# Patient Record
Sex: FEMALE | Race: WHITE | NOT HISPANIC OR LATINO | Employment: UNEMPLOYED | URBAN - METROPOLITAN AREA
[De-identification: names, ages, dates, MRNs, and addresses within clinical notes are randomized per-mention and may not be internally consistent; named-entity substitution may affect disease eponyms.]

---

## 2019-06-17 ENCOUNTER — OFFICE VISIT (OUTPATIENT)
Dept: OTOLARYNGOLOGY | Facility: CLINIC | Age: 54
End: 2019-06-17
Payer: COMMERCIAL

## 2019-06-17 VITALS
HEART RATE: 107 BPM | BODY MASS INDEX: 24.72 KG/M2 | SYSTOLIC BLOOD PRESSURE: 106 MMHG | DIASTOLIC BLOOD PRESSURE: 61 MMHG | WEIGHT: 135.13 LBS | TEMPERATURE: 98 F

## 2019-06-17 DIAGNOSIS — K11.8 SUBMANDIBULAR GLAND MASS: Primary | ICD-10-CM

## 2019-06-17 PROCEDURE — 88173 CYTOPATH EVAL FNA REPORT: CPT | Mod: 26,,, | Performed by: PATHOLOGY

## 2019-06-17 PROCEDURE — 88177 CYTP FNA EVAL EA ADDL: CPT | Mod: 26,,, | Performed by: PATHOLOGY

## 2019-06-17 PROCEDURE — 99204 PR OFFICE/OUTPT VISIT, NEW, LEVL IV, 45-59 MIN: ICD-10-PCS | Mod: 25,S$GLB,, | Performed by: OTOLARYNGOLOGY

## 2019-06-17 PROCEDURE — 3008F BODY MASS INDEX DOCD: CPT | Mod: CPTII,S$GLB,, | Performed by: OTOLARYNGOLOGY

## 2019-06-17 PROCEDURE — 88177 CYTOLOGY SPECIMEN-FNA NON-RADIOLOGY CLINICIAN PERFORMED W/O ON SITE: ICD-10-PCS | Mod: 26,,, | Performed by: PATHOLOGY

## 2019-06-17 PROCEDURE — 10005 FNA BX W/US GDN 1ST LES: CPT | Mod: S$GLB,,, | Performed by: OTOLARYNGOLOGY

## 2019-06-17 PROCEDURE — 99999 PR PBB SHADOW E&M-NEW PATIENT-LVL III: CPT | Mod: PBBFAC,,, | Performed by: OTOLARYNGOLOGY

## 2019-06-17 PROCEDURE — 99204 OFFICE O/P NEW MOD 45 MIN: CPT | Mod: 25,S$GLB,, | Performed by: OTOLARYNGOLOGY

## 2019-06-17 PROCEDURE — 88177 CYTP FNA EVAL EA ADDL: CPT | Performed by: PATHOLOGY

## 2019-06-17 PROCEDURE — 88172 CYTP DX EVAL FNA 1ST EA SITE: CPT | Mod: 26,,, | Performed by: PATHOLOGY

## 2019-06-17 PROCEDURE — 99999 PR PBB SHADOW E&M-NEW PATIENT-LVL III: ICD-10-PCS | Mod: PBBFAC,,, | Performed by: OTOLARYNGOLOGY

## 2019-06-17 PROCEDURE — 10005 PR FINE NEEDLE ASP BIOPSY, W/US GUIDANCE, 1ST LESION: ICD-10-PCS | Mod: S$GLB,,, | Performed by: OTOLARYNGOLOGY

## 2019-06-17 PROCEDURE — 88172 CYTOLOGY SPECIMEN-FNA NON-RADIOLOGY CLINICIAN PERFORMED W/O ON SITE: ICD-10-PCS | Mod: 26,,, | Performed by: PATHOLOGY

## 2019-06-17 PROCEDURE — 88173 CYTOLOGY SPECIMEN-FNA NON-RADIOLOGY CLINICIAN PERFORMED W/O ON SITE: ICD-10-PCS | Mod: 26,,, | Performed by: PATHOLOGY

## 2019-06-17 PROCEDURE — 3008F PR BODY MASS INDEX (BMI) DOCUMENTED: ICD-10-PCS | Mod: CPTII,S$GLB,, | Performed by: OTOLARYNGOLOGY

## 2019-06-17 RX ORDER — LIDOCAINE HYDROCHLORIDE 10 MG/ML
1 INJECTION, SOLUTION EPIDURAL; INFILTRATION; INTRACAUDAL; PERINEURAL ONCE
Status: CANCELLED | OUTPATIENT
Start: 2019-06-17 | End: 2019-06-17

## 2019-06-17 RX ORDER — ATORVASTATIN CALCIUM 10 MG/1
10 TABLET, FILM COATED ORAL NIGHTLY
COMMUNITY

## 2019-06-17 RX ORDER — SODIUM CHLORIDE 0.9 % (FLUSH) 0.9 %
10 SYRINGE (ML) INJECTION
Status: CANCELLED | OUTPATIENT
Start: 2019-06-17

## 2019-06-17 RX ORDER — METFORMIN HYDROCHLORIDE 1000 MG/1
1000 TABLET, FILM COATED, EXTENDED RELEASE ORAL 2 TIMES DAILY
COMMUNITY

## 2019-06-17 RX ORDER — DAPAGLIFLOZIN 10 MG/1
10 TABLET, FILM COATED ORAL DAILY
COMMUNITY

## 2019-06-17 NOTE — LETTER
June 18, 2019      Immanuel Proctor MD  [address] [facility]  [place] [address]           [facility]  [address]  [facility] [address]  Phone: [phone]  Fax: [phone]          Patient: Marylu Augustin   MR Number: [id number]   YOB: 1965   Date of Visit: 6/17/2019       Dear Dr. Immanuel Proctor:    Thank you for referring Marylu Augustin to me for evaluation. Attached you will find relevant portions of my assessment and plan of care.    If you have questions, please do not hesitate to call me. I look forward to following Marylu Augustin along with you.    Sincerely,    Michael Cole MD    Enclosure  CC:  No Recipients    If you would like to receive this communication electronically, please contact [email or web address] or [phone] to request more information on [employer] access.    For providers and/or their staff who would like to refer a patient to [facility], please contact us through our one-stop-shop provider referral line, [facility], at [phone].    If you feel you have received this communication in error or would no longer like to receive these types of communications, please e-mail [email or web address]

## 2019-06-17 NOTE — PRE ADMISSION SCREENING
Anesthesia Assessment: Preoperative EQUATION    Planned Procedure: Procedure(s) (LRB):  EXCISION, SUBMANDIBULAR GLAND (Right)  Requested Anesthesia Type:General  Surgeon: Michael Cole MD  Service: ENT  Known or anticipated Date of Surgery:7/5/2019    Surgeon notes: reviewed    Electronic QUestionnaire Assessment completed via nurse interview with patient.        NO AQ        Triage considerations:     The patient has no apparent active cardiac condition (No unstable coronary Syndrome such as severe unstable angina or recent [<1 month] myocardial infarction, decompensated CHF, severe valvular   disease or significant arrhythmia)    Previous anesthesia records:Not available    Last PCP note: within 1 month , outside [facility]   Subspecialty notes: ENT, Pain Management    Other important co-morbidities: DM, HTN, OBESITY     Tests already available:  No recent tests.            Instructions given. (See in Nurse's note)    Optimization:  Anesthesia Preop Clinic Assessment  Indicated    Medical Opinion Indicated-Not indicated for this procedure          Plan:    Testing:  Hematology Profile, CMP and EKG-Will retrieve from [facility] PCP if avail, if not will order     Patient  has previously scheduled Medical Appointment:NOT AT THIS TIME    Navigation: Tests Scheduled.             Results will be tracked by Preop Clinic.

## 2019-06-18 ENCOUNTER — ANESTHESIA EVENT (OUTPATIENT)
Dept: SURGERY | Facility: HOSPITAL | Age: 54
End: 2019-06-18
Payer: COMMERCIAL

## 2019-06-18 DIAGNOSIS — Z01.818 PREOPERATIVE TESTING: Primary | ICD-10-CM

## 2019-06-18 PROBLEM — K11.8 SUBMANDIBULAR GLAND MASS: Status: ACTIVE | Noted: 2019-06-18

## 2019-06-18 RX ORDER — LOSARTAN POTASSIUM 50 MG/1
50 TABLET ORAL DAILY
COMMUNITY

## 2019-06-18 RX ORDER — TRAZODONE HYDROCHLORIDE 50 MG/1
50 TABLET ORAL NIGHTLY
COMMUNITY
End: 2022-04-22 | Stop reason: CLARIF

## 2019-06-18 RX ORDER — DULOXETIN HYDROCHLORIDE 60 MG/1
90 CAPSULE, DELAYED RELEASE ORAL DAILY
COMMUNITY
End: 2022-04-22 | Stop reason: CLARIF

## 2019-06-18 NOTE — ASSESSMENT & PLAN NOTE
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  Both malignant and benign etiologies are considered.  FNA obtained today.    I recommended that we proceed to the operating room for right submandibular gland resection.  Will send the specimen for frozen section and, should be indicated, proceed with neck dissection concomitantly.  She is amenable to this plan.    We discussed the risks, benefits, and indications to right submandibular gland resection. The risks were noted to include, but not be limited to, infection, bleeding, scarring, collection of blood or tissue fluid requiring drainage, weakness of the lip which may be temporary or permanent, weakness/numbness of the tongue which could be temporary or permanent and cause speech and/or swallowing difficulty, wand the need for additional procedures. Time was allowed for questions, and all questions were answered to the patient's apparent satisfaction.     Surgery is scheduled on July 5, 2019.

## 2019-06-18 NOTE — PROGRESS NOTES
Chief Complaint   Patient presents with    consult/ mass in jaw       HPI   54 y.o. female presents for evaluation of a right-sided submandibular gland mass.  She reports that this mass has been present for at least several weeks.  She noted pain in the submandibular gland at that time. She was treated with course of antibiotics which improved her pain.  She does, however, report that she has recently lost roughly 35 lb intentionally.  She is uncertain if this weight loss has made this lesion more apparent and of the true time course of its presence.  She denies recent dental work, mouth pain, dysphagia or shortness of breath.  CT scan of the neck at an outside hospital revealed an approximately 1.1 cm maximum dimension lesion within the right submandibular gland.  She also underwent an ultrasound of the neck which confirmed this finding.    Review of Systems   Constitutional: Negative for fatigue and unexpected weight change.   HENT: Per HPI.  Eyes: Negative for visual disturbance.   Respiratory: Negative for shortness of breath, hemoptysis   Cardiovascular: Negative for chest pain and palpitations.   Musculoskeletal: Negative for decreased ROM, back pain.   Skin: Negative for rash, sunburn, itching.   Neurological: Negative for dizziness and seizures.   Hematological: Negative for adenopathy. Does not bruise/bleed easily.   Endocrine: Negative for rapid weight loss/weight gain, heat/cold intolerance.     Past Medical History   Patient Active Problem List   Diagnosis    Lumbar radiculopathy    Lumbosacral radiculopathy           Past Surgical History   Past Surgical History:   Procedure Laterality Date    ANTERIOR LUMBAR FUSION W/ FRA      L5 S1    BACK SURGERY  3/5/2014    Cleaned herniated disk L4 and L5    CHOLECYSTECTOMY      ENDOMETRIAL ABLATION      D&C - for heavy periods    INJECTION-STEROID-EPIDURAL-TRANSFORAMINAL (L5,S1) Right 7/10/2015    [facility]     INJECTION-STEROID-EPIDURAL-TRANSFORAMINAL (L5,S1) Right 6/26/2015    [facility] OR    KNEE ARTHROSCOPY Bilateral     Meniscus repair         Family History   Family History   Problem Relation Age of Onset    Hypertension Father     Diabetes Father     Hypertension Mother     Diabetes Mother     Cancer Mother 72        Breast           Social History   .  Social History     Socioeconomic History    Marital status:      Spouse name: Not on file    Number of children: Not on file    Years of education: Not on file    Highest education level: Not on file   Occupational History    Not on file   Social Needs    Financial resource strain: Not on file    Food insecurity:     Worry: Not on file     Inability: Not on file    Transportation needs:     Medical: Not on file     Non-medical: Not on file   Tobacco Use    Smoking status: Never Smoker    Smokeless tobacco: Never Used   Substance and Sexual Activity    Alcohol use: No    Drug use: No    Sexual activity: Yes     Partners: Male     Birth control/protection: Surgical     Comment:    Lifestyle    Physical activity:     Days per week: Not on file     Minutes per session: Not on file    Stress: Not on file   Relationships    Social connections:     Talks on phone: Not on file     Gets together: Not on file     Attends Confucianist service: Not on file     Active member of club or organization: Not on file     Attends meetings of clubs or organizations: Not on file     Relationship status: Not on file   Other Topics Concern    Not on file   Social History Narrative    Not on file         Allergies   Review of patient's allergies indicates:   Allergen Reactions    Sulfa (sulfonamide antibiotics) Hives and Itching           Physical Exam     Vitals:    06/17/19 1341   BP: 106/61   Pulse: 107   Temp: 97.8 °F (36.6 °C)         Body mass index is 24.72 kg/m².      General: AOx3, NAD   Respiratory:  Symmetric chest  rise, normal effort  Nose: No gross nasal septal deviation. Inferior Turbinates WNL bilaterally. No septal perforation. No masses/lesions.   Oral Cavity:  Oral Tongue mobile, no lesions noted. Hard Palate WNL. No buccal or FOM lesions.  Oropharynx:  No masses/lesions of the posterior pharyngeal wall. Tonsillar fossa without lesions. Soft palate without masses. Midline uvula.   Neck: No scars.  No cervical lymphadenopathy, thyromegaly or thyroid nodules.  Normal range of motion.  Approximately 1 cm firm lesion within the right submandibular gland.  Face: House Brackmann I bilaterally.     Outside CT, ultrasound reviewed.    Procedure:  Ultrasound-guided fine-needle aspiration of right submandibular gland mass.    Informed consent obtained.  The lesion in the right submandibular gland was localized.  A normal sized jugulodigastric node of normal morphology was also identified.  It was measured at roughly 1 x 1 cm.  It had a well marginated, hypoechoic appearance.  The overlying skin and subcutaneous tissue was marked and anesthetized with approximately 1 cc of 1% lidocaine with epinephrine.  Under ultrasound guidance, fine-needle aspiration was performed of this lesion utilizing a 22 and a 25 gauge needle.  On site side pathology analyzed specimen and reported only blood within the aspirate.  Additional specimen was sent for permanent analysis.  She tolerated the procedure well.    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  Both malignant and benign etiologies are considered.  FNA obtained today.    I recommended that we proceed to the operating room for right submandibular gland resection.  Will send the specimen for frozen section and, should be indicated, proceed with neck dissection concomitantly.  She is amenable to this plan.    We  discussed the risks, benefits, and indications to right submandibular gland resection. The risks were noted to include, but not be limited to, infection, bleeding, scarring, collection of blood or tissue fluid requiring drainage, weakness of the lip which may be temporary or permanent, weakness/numbness of the tongue which could be temporary or permanent and cause speech and/or swallowing difficulty, wand the need for additional procedures. Time was allowed for questions, and all questions were answered to the patient's apparent satisfaction.     Surgery is scheduled on July 5, 2019.         Relevant Orders    Cytology Specimen-FNA NON-Radiology Clinician Performed Without On Site Pathologist Adequacy    Case Request Operating Room: EXCISION, SUBMANDIBULAR GLAND (Completed)

## 2019-06-18 NOTE — ANESTHESIA PREPROCEDURE EVALUATION
[facility]  Anesthesia Pre-Operative Evaluation         Patient Name: Marylu Augustin  YOB: 1965  MRN: [id number]    SUBJECTIVE:     Pre-operative evaluation for Procedure(s) (LRB):  EXCISION, SUBMANDIBULAR GLAND (Right)     07/03/2019    Marylu Augustin is a 54 y.o. female w/ a significant PMHx of chronic pain syndrome, lumbar radiculopathy, and submandibular mass. The mass is painful and she denies recent dental work, mouth pain, or dysphagia. Imaging confirmed 1.1cm lesion.    Patient now presents for the above procedure(s).    LDA: None documented.    Prev airway: None documented.    Drips: None documented.    Patient Active Problem List   Diagnosis    Lumbar radiculopathy    Lumbosacral radiculopathy    Submandibular gland mass       Review of patient's allergies indicates:   Allergen Reactions    Sulfa (sulfonamide antibiotics) Hives and Itching       Current Outpatient Medications:  No current facility-administered medications for this encounter.     Current Outpatient Medications:     atorvastatin (LIPITOR) 10 MG tablet, Take 10 mg by mouth every evening., Disp: , Rfl:     cyclobenzaprine (FLEXERIL) 10 MG tablet, Take 10 mg by mouth 2 (two) times daily as needed. , Disp: , Rfl: 0    dapagliflozin (FARXIGA) 10 mg Tab, Take 10 mg by mouth once daily., Disp: , Rfl:     dulaglutide (TRULICITY) 0.75 mg/0.5 mL PnIj, Inject 0.75 mg into the skin every 7 days., Disp: , Rfl:     DULoxetine (CYMBALTA) 60 MG capsule, Take 90 mg by mouth once daily., Disp: , Rfl:     gabapentin (NEURONTIN) 600 MG tablet, Take 1 tablet by mouth 3 (three) times daily., Disp: , Rfl:     hydrocodone-acetaminophen 7.5-325mg (NORCO) 7.5-325 mg per tablet, Take 1 tablet by mouth 3 (three) times daily as needed., Disp: , Rfl:     losartan (COZAAR) 50 MG tablet, Take 50 mg by mouth once daily., Disp: , Rfl:     metFORMIN (GLUMETZA) 1000 MG (MOD) 24 hr tablet, Take 1,000 mg by mouth 2 (two)  times daily. , Disp: , Rfl:     traZODone (DESYREL) 50 MG tablet, Take 50 mg by mouth every evening., Disp: , Rfl:     Past Surgical History:   Procedure Laterality Date    ANTERIOR LUMBAR FUSION W/ FRA      L5 S1    BACK SURGERY  3/5/2014    Cleaned herniated disk L4 and L5    CHOLECYSTECTOMY      ENDOMETRIAL ABLATION      D&C - for heavy periods    INJECTION-STEROID-EPIDURAL-TRANSFORAMINAL (L5,S1) Right 7/10/2015    [facility]    INJECTION-STEROID-EPIDURAL-TRANSFORAMINAL (L5,S1) Right 6/26/2015    [facility] OR    KNEE ARTHROSCOPY Bilateral     Meniscus repair       Social History     Socioeconomic History    Marital status:      Spouse name: Not on file    Number of children: Not on file    Years of education: Not on file    Highest education level: Not on file   Occupational History    Not on file   Social Needs    Financial resource strain: Not on file    Food insecurity:     Worry: Not on file     Inability: Not on file    Transportation needs:     Medical: Not on file     Non-medical: Not on file   Tobacco Use    Smoking status: Never Smoker    Smokeless tobacco: Never Used   Substance and Sexual Activity    Alcohol use: No    Drug use: No    Sexual activity: Yes     Partners: Male     Birth control/protection: Surgical     Comment:    Lifestyle    Physical activity:     Days per week: Not on file     Minutes per session: Not on file    Stress: Not on file   Relationships    Social connections:     Talks on phone: Not on file     Gets together: Not on file     Attends Jain service: Not on file     Active member of club or organization: Not on file     Attends meetings of clubs or organizations: Not on file     Relationship status: Not on file   Other Topics Concern    Not on file   Social History Narrative    Not on file       OBJECTIVE:     Vital Signs Range (Last 24H):         Significant Labs:  Lab  Results   Component Value Date    WBC 7.93 06/24/2019    HGB 14.2 06/24/2019    HCT 43.8 06/24/2019     06/24/2019    ALT 55 (H) 06/24/2019    AST 36 06/24/2019     06/24/2019    K 4.3 06/24/2019     06/24/2019    CREATININE 0.8 06/24/2019    BUN 16 06/24/2019    CO2 32 (H) 06/24/2019       Diagnostic Studies: No relevant studies.    EKG:   Vent. Rate : 090 BPM     Atrial Rate : 090 BPM     P-R Int : 160 ms          QRS Dur : 078 ms      QT Int : 360 ms       P-R-T Axes : 044 018 020 degrees     QTc Int : 440 ms    Normal sinus rhythm  Within normal limits    No previous ECGs available  Confirmed by Ton STEWART, Rgahu ([id number]) on 6/24/2019 4:29:31 PM       2D ECHO:  No results found for this or any previous visit.      ASSESSMENT/PLAN:            Adali Interiano, RN   Registered Nurse      Pre Admission Screening   Signed                       []Hide copied text    []Hover for details  Anesthesia Assessment: Preoperative EQUATION     Planned Procedure: Procedure(s) (LRB):  EXCISION, SUBMANDIBULAR GLAND (Right)  Requested Anesthesia Type:General  Surgeon: Michael Cole MD  Service: ENT  Known or anticipated Date of Surgery:7/5/2019     Surgeon notes: reviewed     Electronic QUestionnaire Assessment completed via nurse interview with patient.         NO AQ           Triage considerations:      The patient has no apparent active cardiac condition (No unstable coronary Syndrome such as severe unstable angina or recent [<1 month] myocardial infarction, decompensated CHF, severe valvular   disease or significant arrhythmia)     Previous anesthesia records:Not available     [facility]   Subspecialty notes: ENT, Pain Management     Other important co-morbidities: DM, HTN, OBESITY     Tests already available:  No recent tests.                            Instructions given. (See in Nurse's note)     Optimization:  Anesthesia Preop Clinic Assessment  Indicated     Medical Opinion Indicated-Not indicated for this procedure                                          Plan:    Testing:  Hematology Profile, CMP and EKG-Will retrieve from [facility] PCP if avail, if not will order                           Patient  has previously scheduled Medical Appointment:NOT AT THIS TIME     Navigation: Tests Scheduled.                        Results will be tracked by Preop Clinic.                                                                                                                          06/18/2019  Marylu Augustin is a 54 y.o., female.    Anesthesia Evaluation    I have reviewed the Patient Summary Reports.    I have reviewed the Nursing Notes.      Review of Systems  Anesthesia Hx:  Hx of Anesthetic complications  History of prior surgery of interest to airway management or planning: Previous anesthesia: General COLONOSCOPY 2/2/19 with general anesthesia.  Denies Family Hx of Anesthesia complications.  Personal Hx of Anesthesia complications, Post-Operative Nausea/Vomiting   Social:  Social Alcohol Use, Non-Smoker    Hematology/Oncology:  Hematology Normal   Oncology Normal     EENT/Dental:   Jaw Problems: Submandibular gland mass  Cardiovascular:    Denies Angina.  Functional Capacity 4 METS  Hypertension    Pulmonary:  Pulmonary Normal  Denies Shortness of breath.  Denies Recent URI.    Renal/:  Renal/ Normal     Hepatic/GI:  Liver Disease, Fatty Liver    Musculoskeletal:  Musculoskeletal Normal    Neurological:   Lumbar radiculopathy   Endocrine:   Diabetes, well controlled  Diabetes , controlled by non-insulin injectables, diet, oral hypoglycemics.  Metabolic Disorders  Psych:  Depression and Major Depression, Treated.          Physical Exam  General:  Well nourished    Airway/Jaw/Neck:  Airway Findings: Mouth Opening: Small, but > 3cm Tongue: Normal  General Airway Assessment: Adult  Mallampati: II  TM Distance: Normal, at least 6 cm  Jaw/Neck Findings:  Neck ROM: Normal ROM       Dental:  Dental Findings: In tact   Chest/Lungs:  Chest/Lungs Findings: Clear to auscultation     Heart/Vascular:  Heart Findings: Rate: Normal  Rhythm: Regular Rhythm        Mental Status:  Mental Status Findings:  Cooperative, Alert and Oriented         Anesthesia Plan  Type of Anesthesia, risks & benefits discussed:  Anesthesia Type:  general  Patient's Preference:   Intra-op Monitoring Plan: standard ASA monitors  Intra-op Monitoring Plan Comments:   Post Op Pain Control Plan: per primary service following discharge from PACU, IV/PO Opioids PRN and multimodal analgesia  Post Op Pain Control Plan Comments:   Induction:   IV  Beta Blocker:  Patient is not currently on a Beta-Blocker (No further documentation required).       Informed Consent: Patient understands risks and agrees with Anesthesia plan.  Questions answered. Anesthesia consent signed with patient.  ASA Score: 2     Day of Surgery Review of History & Physical:    H&P update referred to the surgeon.         Ready For Surgery From Anesthesia Perspective.

## 2019-06-18 NOTE — H&P (VIEW-ONLY)
Chief Complaint   Patient presents with    consult/ mass in jaw       HPI   54 y.o. female presents for evaluation of a right-sided submandibular gland mass.  She reports that this mass has been present for at least several weeks.  She noted pain in the submandibular gland at that time. She was treated with course of antibiotics which improved her pain.  She does, however, report that she has recently lost roughly 35 lb intentionally.  She is uncertain if this weight loss has made this lesion more apparent and of the true time course of its presence.  She denies recent dental work, mouth pain, dysphagia or shortness of breath.  CT scan of the neck at an outside hospital revealed an approximately 1.1 cm maximum dimension lesion within the right submandibular gland.  She also underwent an ultrasound of the neck which confirmed this finding.    Review of Systems   Constitutional: Negative for fatigue and unexpected weight change.   HENT: Per HPI.  Eyes: Negative for visual disturbance.   Respiratory: Negative for shortness of breath, hemoptysis   Cardiovascular: Negative for chest pain and palpitations.   Musculoskeletal: Negative for decreased ROM, back pain.   Skin: Negative for rash, sunburn, itching.   Neurological: Negative for dizziness and seizures.   Hematological: Negative for adenopathy. Does not bruise/bleed easily.   Endocrine: Negative for rapid weight loss/weight gain, heat/cold intolerance.     Past Medical History   Patient Active Problem List   Diagnosis    Lumbar radiculopathy    Lumbosacral radiculopathy           Past Surgical History   Past Surgical History:   Procedure Laterality Date    ANTERIOR LUMBAR FUSION W/ FRA      L5 S1    BACK SURGERY  3/5/2014    Cleaned herniated disk L4 and L5    CHOLECYSTECTOMY      ENDOMETRIAL ABLATION      D&C - for heavy periods    INJECTION-STEROID-EPIDURAL-TRANSFORAMINAL (L5,S1) Right 7/10/2015    Performed by Tessy Buchanan [facility] at [facility]     INJECTION-STEROID-EPIDURAL-TRANSFORAMINAL (L5,S1) Right 6/26/2015    [facility]    KNEE ARTHROSCOPY Bilateral     Meniscus repair         Family History   Family History   Problem Relation Age of Onset    Hypertension Father     Diabetes Father     Hypertension Mother     Diabetes Mother     Cancer Mother 72        Breast           Social History   .  Social History     Socioeconomic History    Marital status:      Spouse name: Not on file    Number of children: Not on file    Years of education: Not on file    Highest education level: Not on file   Occupational History    Not on file   Social Needs    Financial resource strain: Not on file    Food insecurity:     Worry: Not on file     Inability: Not on file    Transportation needs:     Medical: Not on file     Non-medical: Not on file   Tobacco Use    Smoking status: Never Smoker    Smokeless tobacco: Never Used   Substance and Sexual Activity    Alcohol use: No    Drug use: No    Sexual activity: Yes     Partners: Male     Birth control/protection: Surgical     Comment:    Lifestyle    Physical activity:     Days per week: Not on file     Minutes per session: Not on file    Stress: Not on file   Relationships    Social connections:     Talks on phone: Not on file     Gets together: Not on file     Attends Bahai service: Not on file     Active member of club or organization: Not on file     Attends meetings of clubs or organizations: Not on file     Relationship status: Not on file   Other Topics Concern    Not on file   Social History Narrative    Not on file         Allergies   Review of patient's allergies indicates:   Allergen Reactions    Sulfa (sulfonamide antibiotics) Hives and Itching           Physical Exam     Vitals:    06/17/19 1341   BP: 106/61   Pulse: 107   Temp: 97.8 °F (36.6 °C)         Body mass index is 24.72 kg/m².      General: AOx3, NAD   Respiratory:  Symmetric chest  rise, normal effort  Nose: No gross nasal septal deviation. Inferior Turbinates WNL bilaterally. No septal perforation. No masses/lesions.   Oral Cavity:  Oral Tongue mobile, no lesions noted. Hard Palate WNL. No buccal or FOM lesions.  Oropharynx:  No masses/lesions of the posterior pharyngeal wall. Tonsillar fossa without lesions. Soft palate without masses. Midline uvula.   Neck: No scars.  No cervical lymphadenopathy, thyromegaly or thyroid nodules.  Normal range of motion.  Approximately 1 cm firm lesion within the right submandibular gland.  Face: House Brackmann I bilaterally.     Outside CT, ultrasound reviewed.    Procedure:  Ultrasound-guided fine-needle aspiration of right submandibular gland mass.    Informed consent obtained.  The lesion in the right submandibular gland was localized.  A normal sized jugulodigastric node of normal morphology was also identified.  It was measured at roughly 1 x 1 cm.  It had a well marginated, hypoechoic appearance.  The overlying skin and subcutaneous tissue was marked and anesthetized with approximately 1 cc of 1% lidocaine with epinephrine.  Under ultrasound guidance, fine-needle aspiration was performed of this lesion utilizing a 22 and a 25 gauge needle.  On site side pathology analyzed specimen and reported only blood within the aspirate.  Additional specimen was sent for permanent analysis.  She tolerated the procedure well.    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  Both malignant and benign etiologies are considered.  FNA obtained today.    I recommended that we proceed to the operating room for right submandibular gland resection.  Will send the specimen for frozen section and, should be indicated, proceed with neck dissection concomitantly.  She is amenable to this plan.    We  discussed the risks, benefits, and indications to right submandibular gland resection. The risks were noted to include, but not be limited to, infection, bleeding, scarring, collection of blood or tissue fluid requiring drainage, weakness of the lip which may be temporary or permanent, weakness/numbness of the tongue which could be temporary or permanent and cause speech and/or swallowing difficulty, wand the need for additional procedures. Time was allowed for questions, and all questions were answered to the patient's apparent satisfaction.     Surgery is scheduled on July 5, 2019.         Relevant Orders    Cytology Specimen-FNA NON-Radiology Clinician Performed Without On Site Pathologist Adequacy    Case Request Operating Room: EXCISION, SUBMANDIBULAR GLAND (Completed)

## 2019-06-19 ENCOUNTER — TELEPHONE (OUTPATIENT)
Dept: PREADMISSION TESTING | Facility: HOSPITAL | Age: 54
End: 2019-06-19

## 2019-06-19 NOTE — TELEPHONE ENCOUNTER
----- Message from Adali Interiano RN sent at 6/18/2019 12:15 PM CDT -----  PLEASE CALL PATIENT [phone] TO SCHEDULE LAB/EKG.                                                 THANKS!

## 2019-06-24 ENCOUNTER — HOSPITAL ENCOUNTER (OUTPATIENT)
Dept: CARDIOLOGY | Facility: CLINIC | Age: 54
Discharge: HOME | End: 2019-06-24
Payer: COMMERCIAL

## 2019-06-24 DIAGNOSIS — Z01.818 PREOPERATIVE TESTING: ICD-10-CM

## 2019-06-24 PROCEDURE — 93010 EKG 12-LEAD: ICD-10-PCS | Mod: S$GLB,,, | Performed by: INTERNAL MEDICINE

## 2019-06-24 PROCEDURE — 93005 EKG 12-LEAD: ICD-10-PCS | Mod: S$GLB,,, | Performed by: ANESTHESIOLOGY

## 2019-06-24 PROCEDURE — 93010 ELECTROCARDIOGRAM REPORT: CPT | Mod: S$GLB,,, | Performed by: INTERNAL MEDICINE

## 2019-06-24 PROCEDURE — 93005 ELECTROCARDIOGRAM TRACING: CPT | Mod: S$GLB,,, | Performed by: ANESTHESIOLOGY

## 2019-07-03 ENCOUNTER — TELEPHONE (OUTPATIENT)
Dept: OTOLARYNGOLOGY | Facility: CLINIC | Age: 54
End: 2019-07-03

## 2019-07-05 ENCOUNTER — ANESTHESIA (OUTPATIENT)
Dept: SURGERY | Facility: HOSPITAL | Age: 54
End: 2019-07-05
Payer: COMMERCIAL

## 2019-07-05 ENCOUNTER — HOSPITAL ENCOUNTER (OUTPATIENT)
Facility: HOSPITAL | Age: 54
Discharge: HOME | End: 2019-07-06
Attending: OTOLARYNGOLOGY | Admitting: OTOLARYNGOLOGY
Payer: COMMERCIAL

## 2019-07-05 DIAGNOSIS — K11.8 SUBMANDIBULAR GLAND MASS: Primary | ICD-10-CM

## 2019-07-05 LAB
POCT GLUCOSE: 125 MG/DL (ref 70–110)
POCT GLUCOSE: 141 MG/DL (ref 70–110)

## 2019-07-05 PROCEDURE — 88305 TISSUE EXAM BY PATHOLOGIST: CPT | Performed by: PATHOLOGY

## 2019-07-05 PROCEDURE — 63600175 PHARM REV CODE 636 W HCPCS: Performed by: OTOLARYNGOLOGY

## 2019-07-05 PROCEDURE — 37000008 HC ANESTHESIA 1ST 15 MINUTES: Performed by: OTOLARYNGOLOGY

## 2019-07-05 PROCEDURE — 25000003 PHARM REV CODE 250: Performed by: OTOLARYNGOLOGY

## 2019-07-05 PROCEDURE — 88331 PATH CONSLTJ SURG 1 BLK 1SPC: CPT | Performed by: PATHOLOGY

## 2019-07-05 PROCEDURE — 94761 N-INVAS EAR/PLS OXIMETRY MLT: CPT

## 2019-07-05 PROCEDURE — 42440 EXCISE SUBMAXILLARY GLAND: CPT | Mod: ,,, | Performed by: OTOLARYNGOLOGY

## 2019-07-05 PROCEDURE — 63600175 PHARM REV CODE 636 W HCPCS: Performed by: STUDENT IN AN ORGANIZED HEALTH CARE EDUCATION/TRAINING PROGRAM

## 2019-07-05 PROCEDURE — 82962 GLUCOSE BLOOD TEST: CPT | Performed by: OTOLARYNGOLOGY

## 2019-07-05 PROCEDURE — 88331 TISSUE SPECIMEN TO PATHOLOGY - SURGERY: ICD-10-PCS | Mod: 26,,, | Performed by: PATHOLOGY

## 2019-07-05 PROCEDURE — D9220A PRA ANESTHESIA: Mod: ,,, | Performed by: ANESTHESIOLOGY

## 2019-07-05 PROCEDURE — D9220A PRA ANESTHESIA: ICD-10-PCS | Mod: ,,, | Performed by: ANESTHESIOLOGY

## 2019-07-05 PROCEDURE — 88331 PATH CONSLTJ SURG 1 BLK 1SPC: CPT | Mod: 26,,, | Performed by: PATHOLOGY

## 2019-07-05 PROCEDURE — 88305 TISSUE EXAM BY PATHOLOGIST: CPT | Mod: 26,,, | Performed by: PATHOLOGY

## 2019-07-05 PROCEDURE — 36000707: Performed by: OTOLARYNGOLOGY

## 2019-07-05 PROCEDURE — 71000033 HC RECOVERY, INTIAL HOUR: Performed by: OTOLARYNGOLOGY

## 2019-07-05 PROCEDURE — 37000009 HC ANESTHESIA EA ADD 15 MINS: Performed by: OTOLARYNGOLOGY

## 2019-07-05 PROCEDURE — 36000706: Performed by: OTOLARYNGOLOGY

## 2019-07-05 PROCEDURE — 25000003 PHARM REV CODE 250: Performed by: STUDENT IN AN ORGANIZED HEALTH CARE EDUCATION/TRAINING PROGRAM

## 2019-07-05 PROCEDURE — 88305 TISSUE SPECIMEN TO PATHOLOGY - SURGERY: ICD-10-PCS | Mod: 26,,, | Performed by: PATHOLOGY

## 2019-07-05 PROCEDURE — 71000039 HC RECOVERY, EACH ADD'L HOUR: Performed by: OTOLARYNGOLOGY

## 2019-07-05 PROCEDURE — 42440 PR EXCISION SUBMAXILLARY GLAND: ICD-10-PCS | Mod: ,,, | Performed by: OTOLARYNGOLOGY

## 2019-07-05 RX ORDER — SUCCINYLCHOLINE CHLORIDE 20 MG/ML
INJECTION INTRAMUSCULAR; INTRAVENOUS
Status: DISCONTINUED | OUTPATIENT
Start: 2019-07-05 | End: 2019-07-05

## 2019-07-05 RX ORDER — TRAZODONE HYDROCHLORIDE 50 MG/1
50 TABLET ORAL NIGHTLY
Status: DISCONTINUED | OUTPATIENT
Start: 2019-07-05 | End: 2019-07-06 | Stop reason: HOSPADM

## 2019-07-05 RX ORDER — LIDOCAINE HCL/PF 100 MG/5ML
SYRINGE (ML) INTRAVENOUS
Status: DISCONTINUED | OUTPATIENT
Start: 2019-07-05 | End: 2019-07-05

## 2019-07-05 RX ORDER — SODIUM CHLORIDE 0.9 % (FLUSH) 0.9 %
10 SYRINGE (ML) INJECTION
Status: DISCONTINUED | OUTPATIENT
Start: 2019-07-05 | End: 2019-07-05

## 2019-07-05 RX ORDER — PHENYLEPHRINE HYDROCHLORIDE 10 MG/ML
INJECTION INTRAVENOUS
Status: DISCONTINUED | OUTPATIENT
Start: 2019-07-05 | End: 2019-07-05

## 2019-07-05 RX ORDER — PROPOFOL 10 MG/ML
VIAL (ML) INTRAVENOUS
Status: DISCONTINUED | OUTPATIENT
Start: 2019-07-05 | End: 2019-07-05

## 2019-07-05 RX ORDER — CEFAZOLIN SODIUM 1 G/3ML
2 INJECTION, POWDER, FOR SOLUTION INTRAMUSCULAR; INTRAVENOUS
Status: COMPLETED | OUTPATIENT
Start: 2019-07-05 | End: 2019-07-05

## 2019-07-05 RX ORDER — MIDAZOLAM HYDROCHLORIDE 1 MG/ML
INJECTION, SOLUTION INTRAMUSCULAR; INTRAVENOUS
Status: DISCONTINUED | OUTPATIENT
Start: 2019-07-05 | End: 2019-07-05

## 2019-07-05 RX ORDER — ROCURONIUM BROMIDE 10 MG/ML
INJECTION, SOLUTION INTRAVENOUS
Status: DISCONTINUED | OUTPATIENT
Start: 2019-07-05 | End: 2019-07-05

## 2019-07-05 RX ORDER — CYCLOBENZAPRINE HCL 5 MG
10 TABLET ORAL 3 TIMES DAILY PRN
Status: DISCONTINUED | OUTPATIENT
Start: 2019-07-05 | End: 2019-07-06 | Stop reason: HOSPADM

## 2019-07-05 RX ORDER — LIDOCAINE HYDROCHLORIDE AND EPINEPHRINE 10; 10 MG/ML; UG/ML
INJECTION, SOLUTION INFILTRATION; PERINEURAL
Status: DISCONTINUED | OUTPATIENT
Start: 2019-07-05 | End: 2019-07-05 | Stop reason: HOSPADM

## 2019-07-05 RX ORDER — ONDANSETRON 2 MG/ML
INJECTION INTRAMUSCULAR; INTRAVENOUS
Status: DISCONTINUED | OUTPATIENT
Start: 2019-07-05 | End: 2019-07-05

## 2019-07-05 RX ORDER — SODIUM CHLORIDE 9 MG/ML
INJECTION, SOLUTION INTRAVENOUS CONTINUOUS
Status: DISCONTINUED | OUTPATIENT
Start: 2019-07-05 | End: 2019-07-05

## 2019-07-05 RX ORDER — ONDANSETRON 8 MG/1
8 TABLET, ORALLY DISINTEGRATING ORAL EVERY 8 HOURS PRN
Status: DISCONTINUED | OUTPATIENT
Start: 2019-07-05 | End: 2019-07-06 | Stop reason: HOSPADM

## 2019-07-05 RX ORDER — LIDOCAINE HYDROCHLORIDE 10 MG/ML
1 INJECTION, SOLUTION EPIDURAL; INFILTRATION; INTRACAUDAL; PERINEURAL ONCE
Status: COMPLETED | OUTPATIENT
Start: 2019-07-05 | End: 2019-07-05

## 2019-07-05 RX ORDER — LOSARTAN POTASSIUM 50 MG/1
50 TABLET ORAL DAILY
Status: DISCONTINUED | OUTPATIENT
Start: 2019-07-05 | End: 2019-07-06 | Stop reason: HOSPADM

## 2019-07-05 RX ORDER — SODIUM CHLORIDE 0.9 % (FLUSH) 0.9 %
10 SYRINGE (ML) INJECTION
Status: DISCONTINUED | OUTPATIENT
Start: 2019-07-05 | End: 2019-07-05 | Stop reason: HOSPADM

## 2019-07-05 RX ORDER — HYDROMORPHONE HYDROCHLORIDE 1 MG/ML
0.2 INJECTION, SOLUTION INTRAMUSCULAR; INTRAVENOUS; SUBCUTANEOUS EVERY 5 MIN PRN
Status: DISCONTINUED | OUTPATIENT
Start: 2019-07-05 | End: 2019-07-05 | Stop reason: HOSPADM

## 2019-07-05 RX ORDER — PROPOFOL 10 MG/ML
VIAL (ML) INTRAVENOUS CONTINUOUS PRN
Status: DISCONTINUED | OUTPATIENT
Start: 2019-07-05 | End: 2019-07-05

## 2019-07-05 RX ORDER — DEXAMETHASONE SODIUM PHOSPHATE 4 MG/ML
INJECTION, SOLUTION INTRA-ARTICULAR; INTRALESIONAL; INTRAMUSCULAR; INTRAVENOUS; SOFT TISSUE
Status: DISCONTINUED | OUTPATIENT
Start: 2019-07-05 | End: 2019-07-05

## 2019-07-05 RX ORDER — MORPHINE SULFATE 2 MG/ML
2 INJECTION, SOLUTION INTRAMUSCULAR; INTRAVENOUS EVERY 4 HOURS PRN
Status: DISCONTINUED | OUTPATIENT
Start: 2019-07-05 | End: 2019-07-06 | Stop reason: HOSPADM

## 2019-07-05 RX ORDER — GABAPENTIN 300 MG/1
600 CAPSULE ORAL 3 TIMES DAILY
Status: DISCONTINUED | OUTPATIENT
Start: 2019-07-05 | End: 2019-07-06 | Stop reason: HOSPADM

## 2019-07-05 RX ADMIN — CYCLOBENZAPRINE HYDROCHLORIDE 10 MG: 5 TABLET, FILM COATED ORAL at 03:07

## 2019-07-05 RX ADMIN — PHENYLEPHRINE HYDROCHLORIDE 100 MCG: 10 INJECTION INTRAVENOUS at 07:07

## 2019-07-05 RX ADMIN — CEFAZOLIN 2 G: 1 INJECTION, POWDER, FOR SOLUTION INTRAMUSCULAR; INTRAVENOUS at 03:07

## 2019-07-05 RX ADMIN — LIDOCAINE HYDROCHLORIDE 100 MG: 20 INJECTION, SOLUTION INTRAVENOUS at 07:07

## 2019-07-05 RX ADMIN — LIDOCAINE HYDROCHLORIDE 0.5 MG: 10 INJECTION, SOLUTION EPIDURAL; INFILTRATION; INTRACAUDAL; PERINEURAL at 05:07

## 2019-07-05 RX ADMIN — HYDROMORPHONE HYDROCHLORIDE 0.2 MG: 1 INJECTION, SOLUTION INTRAMUSCULAR; INTRAVENOUS; SUBCUTANEOUS at 09:07

## 2019-07-05 RX ADMIN — GABAPENTIN 600 MG: 300 CAPSULE ORAL at 09:07

## 2019-07-05 RX ADMIN — CEFAZOLIN 2 G: 1 INJECTION, POWDER, FOR SOLUTION INTRAMUSCULAR; INTRAVENOUS at 11:07

## 2019-07-05 RX ADMIN — DULOXETINE 90 MG: 60 CAPSULE, DELAYED RELEASE ORAL at 11:07

## 2019-07-05 RX ADMIN — CYCLOBENZAPRINE HYDROCHLORIDE 10 MG: 5 TABLET, FILM COATED ORAL at 09:07

## 2019-07-05 RX ADMIN — SODIUM CHLORIDE: 0.9 INJECTION, SOLUTION INTRAVENOUS at 05:07

## 2019-07-05 RX ADMIN — MORPHINE SULFATE 2 MG: 2 INJECTION, SOLUTION INTRAMUSCULAR; INTRAVENOUS at 03:07

## 2019-07-05 RX ADMIN — PHENYLEPHRINE HYDROCHLORIDE 100 MCG: 10 INJECTION INTRAVENOUS at 08:07

## 2019-07-05 RX ADMIN — MORPHINE SULFATE 2 MG: 2 INJECTION, SOLUTION INTRAMUSCULAR; INTRAVENOUS at 07:07

## 2019-07-05 RX ADMIN — SUCCINYLCHOLINE CHLORIDE 60 MG: 20 INJECTION, SOLUTION INTRAMUSCULAR; INTRAVENOUS at 07:07

## 2019-07-05 RX ADMIN — PROPOFOL 140 MG: 10 INJECTION, EMULSION INTRAVENOUS at 07:07

## 2019-07-05 RX ADMIN — REMIFENTANIL HYDROCHLORIDE 0.05 MCG/KG/MIN: 1 INJECTION, POWDER, LYOPHILIZED, FOR SOLUTION INTRAVENOUS at 07:07

## 2019-07-05 RX ADMIN — LOSARTAN POTASSIUM 50 MG: 50 TABLET, FILM COATED ORAL at 09:07

## 2019-07-05 RX ADMIN — GABAPENTIN 600 MG: 300 CAPSULE ORAL at 03:07

## 2019-07-05 RX ADMIN — GABAPENTIN 600 MG: 300 CAPSULE ORAL at 10:07

## 2019-07-05 RX ADMIN — CEFAZOLIN 2 G: 330 INJECTION, POWDER, FOR SOLUTION INTRAMUSCULAR; INTRAVENOUS at 07:07

## 2019-07-05 RX ADMIN — ROCURONIUM BROMIDE 10 MG: 10 INJECTION, SOLUTION INTRAVENOUS at 08:07

## 2019-07-05 RX ADMIN — TRAZODONE HYDROCHLORIDE 50 MG: 50 TABLET ORAL at 10:07

## 2019-07-05 RX ADMIN — MIDAZOLAM HYDROCHLORIDE 1 MG: 1 INJECTION, SOLUTION INTRAMUSCULAR; INTRAVENOUS at 06:07

## 2019-07-05 RX ADMIN — DEXAMETHASONE SODIUM PHOSPHATE 4 MG: 4 INJECTION, SOLUTION INTRAMUSCULAR; INTRAVENOUS at 07:07

## 2019-07-05 RX ADMIN — ROCURONIUM BROMIDE 20 MG: 10 INJECTION, SOLUTION INTRAVENOUS at 07:07

## 2019-07-05 RX ADMIN — PROPOFOL 150 MCG/KG/MIN: 10 INJECTION, EMULSION INTRAVENOUS at 07:07

## 2019-07-05 RX ADMIN — HYDROMORPHONE HYDROCHLORIDE 0.2 MG: 1 INJECTION, SOLUTION INTRAMUSCULAR; INTRAVENOUS; SUBCUTANEOUS at 08:07

## 2019-07-05 RX ADMIN — ONDANSETRON 4 MG: 2 INJECTION INTRAMUSCULAR; INTRAVENOUS at 07:07

## 2019-07-05 RX ADMIN — ROCURONIUM BROMIDE 10 MG: 10 INJECTION, SOLUTION INTRAVENOUS at 07:07

## 2019-07-05 NOTE — INTERVAL H&P NOTE
The patient has been examined and the H&P has been reviewed:    I concur with the findings and no changes have occurred since H&P was written.    Anesthesia/Surgery risks, benefits and alternative options discussed and understood by patient/family.          Active Hospital Problems    Diagnosis  POA    Submandibular gland mass [K11.8]  Yes      Resolved Hospital Problems   No resolved problems to display.

## 2019-07-05 NOTE — TRANSFER OF CARE
"Anesthesia Transfer of Care Note    Patient: Marylu Augustin    Procedure(s) Performed: Procedure(s) (LRB):  EXCISION, SUBMANDIBULAR GLAND (Right)    Patient location: PACU    Anesthesia Type: general    Transport from OR: Transported from OR on room air with adequate spontaneous ventilation    Post pain: adequate analgesia    Post assessment: no apparent anesthetic complications and tolerated procedure well    Post vital signs: stable    Level of consciousness: awake    Nausea/Vomiting: no nausea/vomiting    Complications: none    Transfer of care protocol was followed      Last vitals:   Visit Vitals  /60 (BP Location: Left arm, Patient Position: Lying)   Pulse 87   Temp 36.6 °C (97.8 °F) (Oral)   Resp 16   Ht 5' 2" (1.575 m)   Wt 70.8 kg (156 lb)   SpO2 97%   Breastfeeding? No   BMI 28.53 kg/m²     "

## 2019-07-05 NOTE — PROGRESS NOTES
Attempted to call report,awaiting return call, pt resting quietly,VSS per monitor, pt states pain 3/10 and tolerable,,incision and drain remain dry and intact, eric po meds and fluids, stable at present.

## 2019-07-05 NOTE — PROGRESS NOTES
Report called to Jose F RN, pt resting quiietly,VSS, drain and dressing intact, informed of room number, stable at present, too room via stretcher per PCT.

## 2019-07-05 NOTE — BRIEF OP NOTE
[facility]  Brief Operative Note    SUMMARY     Surgery Date: 7/5/2019     Surgeon(s) and Role:     * Michael Cole MD - Primary     * Alexander Jordan MD - Resident - Assisting        Pre-op Diagnosis:  Submandibular gland mass [K11.8]    Post-op Diagnosis:  Post-Op Diagnosis Codes:     * Submandibular gland mass [K11.8]    Procedure(s) (LRB):  EXCISION, SUBMANDIBULAR GLAND (Right)    Anesthesia: General    Description of Procedure: R SMG excision    Description of the findings of the procedure: Frozen negative for malignancy    Estimated Blood Loss: * No values recorded between 7/5/2019  7:28 AM and 7/5/2019  8:35 AM *         Specimens:   Specimen (12h ago, onward)    Start     Ordered    07/05/19 0753  Specimen to Pathology - Surgery  Once     Comments:  Pre-op Diagnosis: Submandibular gland mass [K11.8]Procedure(s):EXCISION, SUBMANDIBULAR GLAND Number of specimens: 1Name of specimens: 1. Right submandibular gland * freeze inked area * - frozen     Start Status     07/05/19 0753 Collected (07/05/19 0802) Order ID: [id number]       07/05/19 0802

## 2019-07-05 NOTE — OP NOTE
DATE OF PROCEDURE: 7/5/2019     PREOPERATIVE DIAGNOSES:   Submandibular gland mass- Right side    POSTOPERATIVE DIAGNOSES:   Same    SURGEON:  Surgeon(s) and Role:     * Michael Cole MD - Primary     * Alexander Jordan MD - Resident - Assisting      PROCEDURES PERFORMED:   Resection of right submandibular gland    ANESTHESIA: General      INDICATIONS FOR PROCEDURE:   Marylu Augustin is a 54 y.o. woman with a recent history of right submandibular gland mass.  Fine-needle aspiration was nondiagnostic.  Given my concern for malignancy, it was recommended that we proceed to the operating room for the aforementioned procedures.    She was apprised of the risks, benefits and alternatives to surgery.  In spite of the risk inherent to surgery,she provided informed consent for the aforementioned procedures.     PROCEDURE IN DETAIL:  The patient was taken to the operating room and placed on the operating table in the supine position.  General endotracheal anesthesia was induced by the anesthesia team.     The right neck was addressed.  An approximately 4 cm incision was marked in a natural skin crease roughly 2 fingerbreadths below the angle of the mandible.  The intended incision site was injected with several cc of 1% lidocaine with epinephrine.  The neck was then prepped and draped in the standard sterile fashion.    To begin, a 15 blade was utilized to incise the skin.  Sharp dissection proceeded through the underlying subcutaneous tissue and platysma muscle.  An inferiorly based subplatysmal flap was elevated with the Bovie.  Self-retaining hooks were then placed.    The inferior border of the submandibular gland was then identified and skeletonized.  The gland was retracted superiorly to identify the digastric tendon, posterior belly and anterior bellies of the digastric.  The digastric was then skeletonized up to the level of the mylohyoid.  The gland was then freed from the mylohyoid utilizing the  Bovie.  The mylohyoid vessels were then isolated clamped and divided.  Dissection proceeded posteriorly elevating the fascia off the submandibular gland in order to protect the marginal mandibular nerve.  In the posterior, superior aspect of the gland was noted to be a firm mass consistent with a lesion in question.  Just posterior to the gland,  the facial artery was isolated clipped and divided.  Attention was then turned anteriorly.  The mylohyoid was retracted anteriorly and superiorly.  The hypoglossal nerve was identified as were the lingual nerve and submandibular ganglion.  Lander's duct was also identified.  The ganglion and Netta's duct were then clipped and divided and the specimen was amputated and sent to pathology for frozen section analysis.  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    Hemostasis was achieved electrocautery and the wound was closed over a 10 Jordanian Romeo drain which was brought out posteriorly and secured with a 2-0 silk suture.  The wound was closed with 3-0 Vicryl, 4-0 Monocryl and Dermabond.  Once the wounds were closed, she was handed back to anesthesia.  She was awakened, extubated and transferred to recovery in satisfactory condition.    There were no intraoperative complications.  I was present for and participated in the entire procedure as dictated above.       ESTIMATED BLOOD LOSS: Minimal    SPECIMENS:   Specimen (12h ago, onward)    Start     Ordered    07/05/19 0753  Specimen to Pathology - Surgery  Once     Comments:  Pre-op Diagnosis: Submandibular gland mass [K11.8]Procedure(s):EXCISION, SUBMANDIBULAR GLAND Number of specimens: 1Name of specimens: 1. Right submandibular gland * freeze inked area * - frozen     Start Status     07/05/19 0753 Collected (07/05/19 0802) Order ID: [id number]       07/05/19 0802

## 2019-07-05 NOTE — PLAN OF CARE
Problem: Adult Inpatient Plan of Care  Goal: Plan of Care Review  Outcome: Ongoing (interventions implemented as appropriate)  POC reviewed. Pt AAO x 4 with VSS on RA. Right neck incision with dermabond TSERING and Rt neck STEPHANIE intact with small amount of bloody drainage noted. Advanced and tolerating regular diet with no c/o nausea. Pt up to bathroom independently. Pt remains free of falls and injury. No acute distress or needs at this time.  and family at bedside. WCTM

## 2019-07-06 VITALS
BODY MASS INDEX: 28.71 KG/M2 | RESPIRATION RATE: 17 BRPM | HEART RATE: 102 BPM | WEIGHT: 156 LBS | OXYGEN SATURATION: 97 % | SYSTOLIC BLOOD PRESSURE: 122 MMHG | TEMPERATURE: 98 F | HEIGHT: 62 IN | DIASTOLIC BLOOD PRESSURE: 76 MMHG

## 2019-07-06 LAB — POCT GLUCOSE: 153 MG/DL (ref 70–110)

## 2019-07-06 PROCEDURE — 63600175 PHARM REV CODE 636 W HCPCS: Performed by: OTOLARYNGOLOGY

## 2019-07-06 PROCEDURE — 25000003 PHARM REV CODE 250: Performed by: OTOLARYNGOLOGY

## 2019-07-06 RX ORDER — HYDROCODONE BITARTRATE AND ACETAMINOPHEN 7.5; 325 MG/1; MG/1
1 TABLET ORAL EVERY 4 HOURS PRN
Qty: 15 TABLET | Refills: 0 | Status: SHIPPED | OUTPATIENT
Start: 2019-07-06

## 2019-07-06 RX ADMIN — MORPHINE SULFATE 2 MG: 2 INJECTION, SOLUTION INTRAMUSCULAR; INTRAVENOUS at 03:07

## 2019-07-06 RX ADMIN — LOSARTAN POTASSIUM 50 MG: 50 TABLET, FILM COATED ORAL at 09:07

## 2019-07-06 RX ADMIN — DULOXETINE 90 MG: 60 CAPSULE, DELAYED RELEASE ORAL at 09:07

## 2019-07-06 RX ADMIN — MORPHINE SULFATE 2 MG: 2 INJECTION, SOLUTION INTRAMUSCULAR; INTRAVENOUS at 09:07

## 2019-07-06 RX ADMIN — CYCLOBENZAPRINE HYDROCHLORIDE 10 MG: 5 TABLET, FILM COATED ORAL at 09:07

## 2019-07-06 RX ADMIN — GABAPENTIN 600 MG: 300 CAPSULE ORAL at 09:07

## 2019-07-06 NOTE — NURSING
Discharge instructions, medications, and follow-up appointments complete with patient and her  verbalizing understanding and no further questions at this time. No acute distress or needs. Printed prescription for Norco handed to patient. Pt request wheel chair for transport to hospital exit for discharge home with .

## 2019-07-10 NOTE — DISCHARGE SUMMARY
"[facility]  Discharge Summary     Patient ID:  Marylu Augustin  [id number]  54 y.o.  1965    Admit date: 7/5/2019    Discharge Date and Time:  07/10/2019 1:02 PM    Admitting Physician: Michael Cole MD     Discharge Provider: Devi Herrera    Reason for Admission: Submandibular gland mass [K11.8]  Submandibular gland mass [K11.8]  Submandibular gland mass [K11.8]  Submandibular gland mass [K11.8]    Admission Condition: stable    Procedures Performed: Procedure(s) (LRB):  EXCISION, SUBMANDIBULAR GLAND (Right)    Hospital Course 54 year old F had a diagnostic R submandibular gland mass excision on 7/5/2019. Her hospital course was uncomplicated. Her drain was removed and she was discharged with a follow up appointment in three weeks.        Final Diagnoses:    Principal Problem: Submandibular gland mass   Secondary Diagnoses:   Active Hospital Problems    Diagnosis  POA    *Submandibular gland mass [K11.8]  Yes      Resolved Hospital Problems   No resolved problems to display.       Discharged Condition: stable    Discharge Exam:    /76 (BP Location: Right arm, Patient Position: Lying)   Pulse 102   Temp 97.6 °F (36.4 °C) (Oral)   Resp 17   Ht 5' 2" (1.575 m)   Wt 70.8 kg (156 lb)   SpO2 97%   Breastfeeding? No   BMI 28.53 kg/m²     General Appearance:    Alert, cooperative, no distress, appears stated age   Head:    Normocephalic, without obvious abnormality, atraumatic   Eyes:    PERRL, conjunctiva/corneas clear, EOM's intact, fundi     benign, both eyes   Ears:    Normal TM's and external ear canals, both ears   Nose:   Nares normal, septum midline, mucosa normal, no drainage    or sinus tenderness   Throat:   Lips, mucosa, and tongue normal; teeth and gums normal   Neck:   Supple, symmetrical, trachea midline, no adenopathy;     thyroid:  no enlargement/tenderness/nodules; no carotid    bruit or JVD   Back:     Symmetric, no curvature, ROM normal, no CVA " tenderness   Lungs:     Clear to auscultation bilaterally, respirations unlabored   Chest Wall:    No tenderness or deformity    Heart:    Regular rate and rhythm, S1 and S2 normal, no murmur, rub   or gallop   Breast Exam:    No tenderness, masses, or nipple abnormality   Abdomen:     Soft, non-tender, bowel sounds active all four quadrants,     no masses, no organomegaly   Genitalia:    Normal female without lesion, discharge or tenderness   Rectal:    Normal tone, no masses or tenderness;    guaiac negative stool   Extremities:   Extremities normal, atraumatic, no cyanosis or edema   Pulses:   2+ and symmetric all extremities   Skin:   Skin color, texture, turgor normal, no rashes or lesions   Lymph nodes:   Cervical, supraclavicular, and axillary nodes normal   Neurologic:   CNII-XII intact, normal strength, sensation and reflexes     throughout       Disposition: Home or Self Care    Follow Up/Patient Instructions:     Medications:  Reconciled Home Medications:      Medication List      CHANGE how you take these medications    HYDROcodone-acetaminophen 7.5-325 mg per tablet  Commonly known as:  NORCO  Take 1 tablet by mouth every 4 (four) hours as needed for Pain.  What changed:    · when to take this  · reasons to take this        CONTINUE taking these medications    atorvastatin 10 MG tablet  Commonly known as:  LIPITOR  Take 10 mg by mouth every evening.     cyclobenzaprine 10 MG tablet  Commonly known as:  FLEXERIL  Take 10 mg by mouth 2 (two) times daily as needed.     DULoxetine 60 MG capsule  Commonly known as:  CYMBALTA  Take 90 mg by mouth once daily.     FARXIGA 10 mg Tab  Generic drug:  dapagliflozin  Take 10 mg by mouth once daily.     gabapentin 600 MG tablet  Commonly known as:  NEURONTIN  Take 1 tablet by mouth 3 (three) times daily.     losartan 50 MG tablet  Commonly known as:  COZAAR  Take 50 mg by mouth once daily.     metFORMIN 1000 MG (MOD) 24 hr tablet  Commonly known as:  GLUMETZA  Take  1,000 mg by mouth 2 (two) times daily.     traZODone 50 MG tablet  Commonly known as:  DESYREL  Take 50 mg by mouth every evening.     TRULICITY 0.75 mg/0.5 mL Pnij  Generic drug:  dulaglutide  Inject 0.75 mg into the skin every 7 days.          Discharge Procedure Orders   Diet Adult Regular     Lifting restrictions   Order Comments: No heavy lifting greater than 10 pounds for two weeks.     Notify your health care provider if you experience any of the following:  temperature >100.4     Notify your health care provider if you experience any of the following:  persistent nausea and vomiting or diarrhea     Notify your health care provider if you experience any of the following:  severe uncontrolled pain     Notify your health care provider if you experience any of the following:  redness, tenderness, or signs of infection (pain, swelling, redness, odor or green/yellow discharge around incision site)     Notify your health care provider if you experience any of the following:  difficulty breathing or increased cough     Notify your health care provider if you experience any of the following:  severe persistent headache     No dressing needed     Activity as tolerated     Follow-up Information     Leah ySed NP In 3 weeks.    Specialty:  Otolaryngology  Why:  For wound re-check  Contact information:  [address] [facility] [place] [address]  [phone]                 Activity: no heavy lifting, pushing, pulling greater than 10 pounds for two weeks  Diet: advance as tolerated  Wound Care: keep wound clean and dry    Follow-up with Leah Syed in 3 weeks for wound check.     Signed:  Devi Herrera  7/10/2019  1:01 PM

## 2019-08-05 NOTE — PROGRESS NOTES
Chief Complaint   Patient presents with    Post-op Evaluation       HPI   54 y.o. female returns for a post op visit. She has been doing well since her surgery. She has no pain. No complaints.    Review of Systems   Constitutional: Negative for fatigue and unexpected weight change.   HENT: Per HPI.  Eyes: Negative for visual disturbance.   Respiratory: Negative for shortness of breath, hemoptysis   Cardiovascular: Negative for chest pain and palpitations.   Musculoskeletal: Negative for decreased ROM, back pain.   Skin: Negative for rash, sunburn, itching.   Neurological: Negative for dizziness and seizures.   Hematological: Negative for adenopathy. Does not bruise/bleed easily.   Endocrine: Negative for rapid weight loss/weight gain, heat/cold intolerance.     Past Medical History   Patient Active Problem List   Diagnosis    Lumbar radiculopathy    Lumbosacral radiculopathy    Submandibular gland mass           Past Surgical History   Past Surgical History:   Procedure Laterality Date    ANTERIOR LUMBAR FUSION W/ FRA      L5 S1    BACK SURGERY  3/5/2014    Cleaned herniated disk L4 and L5    CHOLECYSTECTOMY      ENDOMETRIAL ABLATION      D&C - for heavy periods    EXCISION, SUBMANDIBULAR GLAND Right 7/5/2019    Performed by [facility] at [facility] OR 2ND FLR    INJECTION-STEROID-EPIDURAL-TRANSFORAMINAL (L5,S1) Right 7/10/2015    Per[facility] at [facility] OR    INJECTION-STEROID-EPIDURAL-TRANSFORAMINAL (L5,S1) Right 6/26/2015    Performed [facility] at [facility] OR    KNEE ARTHROSCOPY Bilateral     Meniscus repair         Family History   Family History   Problem Relation Age of Onset    Hypertension Father     Diabetes Father     Hypertension Mother     Diabetes Mother     Cancer Mother 72        Breast           Social History   .  Social History     Socioeconomic History    Marital status:      Spouse name: Not on file    Number of children: Not on file     Years of education: Not on file    Highest education level: Not on file   Occupational History    Not on file   Social Needs    Financial resource strain: Not on file    Food insecurity:     Worry: Not on file     Inability: Not on file    Transportation needs:     Medical: Not on file     Non-medical: Not on file   Tobacco Use    Smoking status: Never Smoker    Smokeless tobacco: Never Used   Substance and Sexual Activity    Alcohol use: No    Drug use: No    Sexual activity: Yes     Partners: Male     Birth control/protection: Surgical     Comment:    Lifestyle    Physical activity:     Days per week: Not on file     Minutes per session: Not on file    Stress: Not on file   Relationships    Social connections:     Talks on phone: Not on file     Gets together: Not on file     Attends Protestant service: Not on file     Active member of club or organization: Not on file     Attends meetings of clubs or organizations: Not on file     Relationship status: Not on file   Other Topics Concern    Not on file   Social History Narrative    Not on file         Allergies   Review of patient's allergies indicates:   Allergen Reactions    Sulfa (sulfonamide antibiotics) Hives and Itching           Physical Exam     Vitals:    08/06/19 1027   BP: 112/79   Pulse: 107   Temp: 98.2 °F (36.8 °C)         Body mass index is 29.27 kg/m².      General: AOx3, NAD   Respiratory:  Symmetric chest rise, normal effort  Neck: Right neck Incision CDI.  No redness, drainage, or fluid collection noted.  Edges well approximated.  Face: House Brackmann I bilaterally.     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 We discussed her path report. We also discussed scar massage and wearing  sunscreen to prevent scarring. Questions answered. RTC prn.

## 2019-08-06 ENCOUNTER — OFFICE VISIT (OUTPATIENT)
Dept: OTOLARYNGOLOGY | Facility: CLINIC | Age: 54
End: 2019-08-06
Payer: COMMERCIAL

## 2019-08-06 VITALS
SYSTOLIC BLOOD PRESSURE: 112 MMHG | TEMPERATURE: 98 F | BODY MASS INDEX: 29.27 KG/M2 | HEART RATE: 107 BPM | WEIGHT: 160.06 LBS | DIASTOLIC BLOOD PRESSURE: 79 MMHG

## 2019-08-06 DIAGNOSIS — K11.8 SUBMANDIBULAR GLAND MASS: Primary | ICD-10-CM

## 2019-08-06 PROCEDURE — 99024 POSTOP FOLLOW-UP VISIT: CPT | Mod: S$GLB,,, | Performed by: NURSE PRACTITIONER

## 2019-08-06 PROCEDURE — 99999 PR PBB SHADOW E&M-EST. PATIENT-LVL III: ICD-10-PCS | Mod: PBBFAC,,, | Performed by: NURSE PRACTITIONER

## 2019-08-06 PROCEDURE — 99024 PR POST-OP FOLLOW-UP VISIT: ICD-10-PCS | Mod: S$GLB,,, | Performed by: NURSE PRACTITIONER

## 2019-08-06 PROCEDURE — 99999 PR PBB SHADOW E&M-EST. PATIENT-LVL III: CPT | Mod: PBBFAC,,, | Performed by: NURSE PRACTITIONER

## 2019-08-06 NOTE — ASSESSMENT & PLAN NOTE
Marylu Augustin is healing well. We discussed her path report. We also discussed scar massage and wearing sunscreen to prevent scarring. Questions answered. RTC prn.

## 2022-01-21 NOTE — PLAN OF CARE
"Chief Complaint  Follow-up ([facility])    Subjective          Jalen Lockwood presents to [facility]  Patient presents to the office today for a follow up from hospital admission. Patient admitted on 1/9/22-1/12/22 for BLE cellulitis. Patient has a significant health history. Patient has charcot's joint foot, COPD, Afib, HTN. Patient BP is stable today, pt reports pain has improved, as well as, overall fatigue. Patient denies fever/chills. Patient reports wound nurse, home health and physcial therapy is following him at home. Patient is getting feet legs wrapped 2x weekly. Patient is using his walker to assist with ambulation today.           Objective   Vital Signs:   /78 (BP Location: Left arm, Patient Position: Sitting, Cuff Size: Large Adult)   Pulse 100   Temp 98.4 °F (36.9 °C) (Infrared)   Ht 182.9 cm (72\")   Wt (!) 143 kg (314 lb 3.2 oz)   SpO2 99%   BMI 42.61 kg/m²     Physical Exam  Constitutional:       General: He is not in acute distress.     Appearance: Normal appearance. He is not ill-appearing, toxic-appearing or diaphoretic.   HENT:      Head: Normocephalic.      Right Ear: Tympanic membrane and external ear normal. There is no impacted cerumen.      Left Ear: Tympanic membrane and external ear normal. There is no impacted cerumen.      Nose: Nose normal. No congestion or rhinorrhea.      Mouth/Throat:      Mouth: Mucous membranes are moist.      Pharynx: Oropharynx is clear. No oropharyngeal exudate or posterior oropharyngeal erythema.   Eyes:      General:         Right eye: No discharge.         Left eye: No discharge.      Extraocular Movements: Extraocular movements intact.      Conjunctiva/sclera: Conjunctivae normal.      Pupils: Pupils are equal, round, and reactive to light.   Neck:      Vascular: No carotid bruit.   Cardiovascular:      Rate and Rhythm: Normal rate and regular rhythm.      Pulses: Normal pulses.      Heart sounds: Normal " Problem: Adult Inpatient Plan of Care  Goal: Plan of Care Review  Outcome: Ongoing (interventions implemented as appropriate)  Patient AAOX4. Pain controlled with prn morphine. STEPHANIE intact to left neck with minimal output. Tolerating diet. Normotensive. Voids without difficulty. No issues overnight. Safety measures in place. Will monitor closely.       heart sounds. No murmur heard.  No friction rub. No gallop.    Pulmonary:      Effort: Pulmonary effort is normal. No respiratory distress.      Breath sounds: Normal breath sounds. No wheezing, rhonchi or rales.   Chest:      Chest wall: No tenderness.   Abdominal:      General: Abdomen is flat. Bowel sounds are normal. There is no distension.      Palpations: Abdomen is soft. There is no mass.      Tenderness: There is no abdominal tenderness. There is no guarding or rebound.      Hernia: No hernia is present.   Musculoskeletal:         General: No swelling or tenderness. Normal range of motion.      Cervical back: Normal range of motion and neck supple. No tenderness.   Skin:     General: Skin is warm and dry.      Coloration: Skin is not jaundiced or pale.      Findings: Wound present. No erythema or rash.      Comments: Bilateral legs wrapped, rates pain 3, no obvious signs of drainage on bandage.   Neurological:      Mental Status: He is alert and oriented to person, place, and time.      Sensory: No sensory deficit.      Motor: No weakness.      Gait: Gait normal.   Psychiatric:         Mood and Affect: Mood normal.         Behavior: Behavior normal.         Thought Content: Thought content normal.         Judgment: Judgment normal.        Result Review :   The following data was reviewed by: ESTEFANY Loco on 01/21/2022:  Common labs    Common Labsle 1/10/22 1/10/22 1/11/22 1/12/22 1/12/22    1846 1846  0901 0901   Glucose  118 (A)   103 (A)   BUN  16   11   Creatinine  0.78   0.75 (A)   eGFR Non African Am  98   103   Sodium  130 (A)   137   Potassium  3.7   3.7   Chloride  93 (A)   95 (A)   Calcium  9.8   9.8   Albumin     3.90   Total Bilirubin     0.6   Alkaline Phosphatase     84   AST (SGOT)     39   ALT (SGPT)     17   WBC 11.25 (A)  8.21 6.35    Hemoglobin 13.1  13.4 13.3    Hematocrit 36.5 (A)  39.4 39.0    Platelets 132 (A)  142 156    (A) Abnormal value            Data reviewed:  Radiologic studies bilateral feet xray, chest xray           Assessment and Plan    Diagnoses and all orders for this visit:    1. Cellulitis of lower extremity, unspecified laterality (Primary)  Assessment & Plan:  Wound care following patient, completed bactrim   IV antibotics.       2. Permanent atrial fibrillation (HCC)    3. Essential hypertension  Assessment & Plan:  bp is stable pt takes lasix carvediolol. Patient denes cp soa  Disussed with patient the importance of maintaining a normal BMI.  Reviewed the Dash diet, dietary sodium restriction.  Encourage the patient to engage in 30 minutes of regular aerobic physical activity at least 3 days a week.  Limit alcohol consumption to no more than 2 drinks per day for men, 1 drink per day for women.  Patient voiced understanding all questions answered.        4. Charcot's joint of foot, unspecified laterality    5. Chronic obstructive pulmonary disease, unspecified COPD type (HCC)    6. Lymphedema of both lower extremities    7. Chronic edema    8. Generalized weakness    9. Tobacco abuse    I spent 30 minutes caring for Jalen on this date of service. This time includes time spent by me in the following activities:preparing for the visit, reviewing tests, obtaining and/or reviewing a separately obtained history, performing a medically appropriate examination and/or evaluation , counseling and educating the patient/family/caregiver, ordering medications, tests, or procedures, referring and communicating with other health care professionals , documenting information in the medical record and independently interpreting results and communicating that information with the patient/family/caregiver  Follow Up   Return in about 3 months (around 4/21/2022).  Patient was given instructions and counseling regarding his condition or for health maintenance advice. Please see specific information pulled into the AVS if appropriate.         QUICK REFERENCE INFORMATION:  The  ABCs of the Annual Wellness Visit    Subsequent Medicare Wellness Visit    HEALTH RISK ASSESSMENT    1952    Recent Hospitalizations:  Recently treated at the following:  [facility].        Current Medical Providers:  Patient Care Team:  Marc Ludwig MD as PCP - General (Family Medicine)  Blas Mckeon MD as Surgeon (General Surgery)  Jonnathan Boston II, MD as Consulting Physician (Vascular Surgery)  Xavi Elliott MD as Consulting Physician (Urology)  Marc Benavidez MD as Consulting Physician (Pain Medicine)  Kurt Henry MD as Consulting Physician (Cardiology)  Meghna Horan SHELLEY as Pharmacist  Rip Samuel MD as Referring Physician (Family Medicine)  Juni Landa MD as Consulting Physician (Hematology and Oncology)  Brendan Live, PharmD as Pharmacist (Pharmacy)        Smoking Status:  Social History     Tobacco Use   Smoking Status Former Smoker   • Packs/day: 0.25   • Years: 45.00   • Pack years: 11.25   • Types: Cigarettes   • Quit date: 3/30/2021   • Years since quittin.8   Smokeless Tobacco Never Used   Tobacco Comment    caffeine use - 1.5 cups coffee daily        Alcohol Consumption:  Social History     Substance and Sexual Activity   Alcohol Use Yes   • Alcohol/week: 4.0 - 5.0 standard drinks   • Types: 1 - 2 Shots of liquor, 3 Standard drinks or equivalent per week    Comment: 2 rum a day       Depression Screen:   PHQ-2/PHQ-9 Depression Screening 10/12/2021   Little interest or pleasure in doing things 0   Feeling down, depressed, or hopeless 0   Trouble falling or staying asleep, or sleeping too much -   Feeling tired or having little energy -   Poor appetite or overeating -   Feeling bad about yourself - or that you are a failure or have let yourself or your family down -   Trouble concentrating on things, such as reading the newspaper or watching television -   Moving or speaking so slowly that other people could have noticed. Or the opposite - being  so fidgety or restless that you have been moving around a lot more than usual -   Thoughts that you would be better off dead, or of hurting yourself in some way -   Total Score 0   If you checked off any problems, how difficult have these problems made it for you to do your work, take care of things at home, or get along with other people? -       Health Habits and Functional and Cognitive Screening:  No flowsheet data found.        Does the patient have evidence of cognitive impairment? No    Aspirin use counseling: no takes coumadin      Recent Lab Results:  CMP:  Lab Results   Component Value Date    BUN 11 01/12/2022    CREATININE 0.75 (L) 01/12/2022    EGFRIFNONA 103 01/12/2022    EGFRIFAFRI 102 11/20/2018    BCR 14.7 01/12/2022     01/12/2022    K 3.7 01/12/2022    CO2 33.0 (H) 01/12/2022    CALCIUM 9.8 01/12/2022    PROTENTOTREF 6.1 04/26/2019    ALBUMIN 3.90 01/12/2022    LABGLOBREF 3.0 04/26/2019    LABIL2 1.1 04/26/2019    BILITOT 0.6 01/12/2022    ALKPHOS 84 01/12/2022    AST 39 01/12/2022    ALT 17 01/12/2022     Lipid Panel:  Lab Results   Component Value Date    CHOL 151 03/03/2021    TRIG 77 03/03/2021    HDL 73 (H) 03/03/2021    VLDL 15 03/03/2021    LDLHDL 0.86 03/03/2021     HbA1c:  Lab Results   Component Value Date    HGBA1C 5.00 03/30/2021       Visual Acuity:  No exam data present    Age-appropriate Screening Schedule:  Refer to the list below for future screening recommendations based on patient's age, sex and/or medical conditions. Orders for these recommended tests are listed in the plan section. The patient has been provided with a written plan.    Health Maintenance   Topic Date Due   • ZOSTER VACCINE (1 of 2) Never done   • LIPID PANEL  03/03/2022   • TDAP/TD VACCINES (2 - Td or Tdap) 02/21/2026   • INFLUENZA VACCINE  Completed        Subjective   History of Present Illness    Jalen Lockwood is a 69 y.o. male who presents for an Subsequent Wellness Visit.    The following portions  of the patient's history were reviewed and updated as appropriate:   He  has a past medical history of Allergic rhinitis, Anxiety, Aortectasia (HCC), Arthritis, Atrial flutter (HCC) (2010), Charcot's joint of foot, Chronic edema, Chronic venous insufficiency, COPD (chronic obstructive pulmonary disease) (HCC), Coronary atherosclerosis, Diverticulosis, Duodenitis, Fatty liver, Gastritis, Gastroparesis, Hematoma, Hyperlipidemia, Hypertension, Insomnia, Internal hemorrhoids, Open wound, Osteomyelitis (HCC), Paroxysmal atrial fibrillation (HCC), Peripheral neuropathy, Popliteal artery aneurysm (HCC), Skin cancer, Sleep apnea, Tachycardia induced cardiomyopathy (HCC), Venous stasis, and Venous stasis ulcer ([facility]).  He does not have any pertinent problems on file.  He  has a past surgical history that includes Total knee arthroplasty (Bilateral); Tonsillectomy (1958); Repair knee ligament; Other surgical history; Repair aneurysm / pseudo aneurysm / ruptured aneurysm popliteal artery; Colonoscopy (09/28/2015); Upper gastrointestinal endoscopy (09/16/2014); Hip Arthroplasty (Right, 2017); Cardiac catheterization; Cataract extraction; Colonoscopy (N/A, 9/11/2018); Joint replacement (Left); Colonoscopy (N/A, 10/29/2019); Bronchoscopy (N/A, 4/12/2021); and Excision basal cell carcinoma.  His family history includes Emphysema in his father.  He  reports that he quit smoking about 9 months ago. His smoking use included cigarettes. He has a 11.25 pack-year smoking history. He has never used smokeless tobacco. He reports current alcohol use of about 4.0 - 5.0 standard drinks of alcohol per week. He reports that he does not use drugs.  Current Outpatient Medications   Medication Sig Dispense Refill   • acidophilus (FLORANEX) tablet tablet      • albuterol sulfate  (90 Base) MCG/ACT inhaler INHALE 2 PUFFS BY MOUTH EVERY 4 HOURS AS NEEDED FOR WHEEZE 2 g 6   • ALPRAZolam (XANAX) 0.5 MG tablet Take 1 tablet by mouth At Night As  Needed for Anxiety. 30 tablet 1   • bacitracin 500 UNIT/GM ointment Apply 1 application topically to the appropriate area as directed 2 (Two) Times a Day. 14 g 0   • Breo Ellipta 200-25 MCG/INH inhaler INHALE 1 PUFF BY MOUTH EVERY DAY 60 each 4   • carvedilol (COREG) 6.25 MG tablet Take 1 tablet by mouth 2 (Two) Times a Day With Meals. 180 tablet 3   • docusate sodium (COLACE) 100 MG capsule Take 100 mg by mouth Daily.     • finasteride (PROSCAR) 5 MG tablet Take 1 tablet by mouth daily.     • furosemide (LASIX) 40 MG tablet Take 40 mg by mouth Daily.     • gabapentin (NEURONTIN) 600 MG tablet Take 600 mg by mouth 3 (Three) Times a Day.     • HYDROcodone-acetaminophen (NORCO)  MG per tablet Take 1 tablet by mouth Every 6 (Six) Hours As Needed for Moderate Pain . 6 tablet 0   • hydrocortisone-bacitracin-zinc oxide-nystatin (MAGIC BARRIER) Apply 1 application topically to the appropriate area as directed 2 (Two) Times a Day. 60 g 0   • ipratropium (Atrovent HFA) 17 MCG/ACT inhaler Inhale 2 puffs 4 (Four) Times a Day. 12.9 each 3   • metoclopramide (REGLAN) 10 MG tablet TAKE 1 TABLET BY MOUTH 4 (FOUR) TIMES A DAY BEFORE MEALS & AT BEDTIME. 360 tablet 3   • nystatin (MYCOSTATIN) 961733 UNIT/GM powder Apply  topically to the appropriate area as directed Every 12 (Twelve) Hours.     • OXYGEN-HELIUM IN 2 L into the nostril(s) as directed by provider As Needed.     • pravastatin (PRAVACHOL) 20 MG tablet Take 1 tablet by mouth Daily. 90 tablet 1   • sennosides-docusate (PERICOLACE) 8.6-50 MG per tablet Take 2 tablets by mouth 2 (Two) Times a Day.     • silodosin (RAPAFLO) 8 MG capsule capsule Take 1 capsule by mouth daily. With food.     • sulfamethoxazole-trimethoprim (BACTRIM,SEPTRA) 400-80 MG tablet Take 1 tablet by mouth 2 (Two) Times a Day.     • warfarin (COUMADIN) 5 MG tablet Take 5 mg daily while on Bactrim therapy.  Indications: Atrial Fibrillation       No current facility-administered medications for this  visit.     Current Outpatient Medications on File Prior to Visit   Medication Sig   • acidophilus (FLORANEX) tablet tablet    • albuterol sulfate  (90 Base) MCG/ACT inhaler INHALE 2 PUFFS BY MOUTH EVERY 4 HOURS AS NEEDED FOR WHEEZE   • ALPRAZolam (XANAX) 0.5 MG tablet Take 1 tablet by mouth At Night As Needed for Anxiety.   • bacitracin 500 UNIT/GM ointment Apply 1 application topically to the appropriate area as directed 2 (Two) Times a Day.   • Breo Ellipta 200-25 MCG/INH inhaler INHALE 1 PUFF BY MOUTH EVERY DAY   • carvedilol (COREG) 6.25 MG tablet Take 1 tablet by mouth 2 (Two) Times a Day With Meals.   • docusate sodium (COLACE) 100 MG capsule Take 100 mg by mouth Daily.   • finasteride (PROSCAR) 5 MG tablet Take 1 tablet by mouth daily.   • furosemide (LASIX) 40 MG tablet Take 40 mg by mouth Daily.   • gabapentin (NEURONTIN) 600 MG tablet Take 600 mg by mouth 3 (Three) Times a Day.   • HYDROcodone-acetaminophen (NORCO)  MG per tablet Take 1 tablet by mouth Every 6 (Six) Hours As Needed for Moderate Pain .   • hydrocortisone-bacitracin-zinc oxide-nystatin (MAGIC BARRIER) Apply 1 application topically to the appropriate area as directed 2 (Two) Times a Day.   • ipratropium (Atrovent HFA) 17 MCG/ACT inhaler Inhale 2 puffs 4 (Four) Times a Day.   • metoclopramide (REGLAN) 10 MG tablet TAKE 1 TABLET BY MOUTH 4 (FOUR) TIMES A DAY BEFORE MEALS & AT BEDTIME.   • nystatin (MYCOSTATIN) 872105 UNIT/GM powder Apply  topically to the appropriate area as directed Every 12 (Twelve) Hours.   • OXYGEN-HELIUM IN 2 L into the nostril(s) as directed by provider As Needed.   • pravastatin (PRAVACHOL) 20 MG tablet Take 1 tablet by mouth Daily.   • sennosides-docusate (PERICOLACE) 8.6-50 MG per tablet Take 2 tablets by mouth 2 (Two) Times a Day.   • silodosin (RAPAFLO) 8 MG capsule capsule Take 1 capsule by mouth daily. With food.   • sulfamethoxazole-trimethoprim (BACTRIM,SEPTRA) 400-80 MG tablet Take 1 tablet by mouth  2 (Two) Times a Day.   • warfarin (COUMADIN) 5 MG tablet Take 5 mg daily while on Bactrim therapy.  Indications: Atrial Fibrillation     No current facility-administered medications on file prior to visit.     He is allergic to cephalexin and codeine..    Outpatient Medications Prior to Visit   Medication Sig Dispense Refill   • acidophilus (FLORANEX) tablet tablet      • albuterol sulfate  (90 Base) MCG/ACT inhaler INHALE 2 PUFFS BY MOUTH EVERY 4 HOURS AS NEEDED FOR WHEEZE 2 g 6   • ALPRAZolam (XANAX) 0.5 MG tablet Take 1 tablet by mouth At Night As Needed for Anxiety. 30 tablet 1   • bacitracin 500 UNIT/GM ointment Apply 1 application topically to the appropriate area as directed 2 (Two) Times a Day. 14 g 0   • Breo Ellipta 200-25 MCG/INH inhaler INHALE 1 PUFF BY MOUTH EVERY DAY 60 each 4   • carvedilol (COREG) 6.25 MG tablet Take 1 tablet by mouth 2 (Two) Times a Day With Meals. 180 tablet 3   • docusate sodium (COLACE) 100 MG capsule Take 100 mg by mouth Daily.     • finasteride (PROSCAR) 5 MG tablet Take 1 tablet by mouth daily.     • furosemide (LASIX) 40 MG tablet Take 40 mg by mouth Daily.     • gabapentin (NEURONTIN) 600 MG tablet Take 600 mg by mouth 3 (Three) Times a Day.     • HYDROcodone-acetaminophen (NORCO)  MG per tablet Take 1 tablet by mouth Every 6 (Six) Hours As Needed for Moderate Pain . 6 tablet 0   • hydrocortisone-bacitracin-zinc oxide-nystatin (MAGIC BARRIER) Apply 1 application topically to the appropriate area as directed 2 (Two) Times a Day. 60 g 0   • ipratropium (Atrovent HFA) 17 MCG/ACT inhaler Inhale 2 puffs 4 (Four) Times a Day. 12.9 each 3   • metoclopramide (REGLAN) 10 MG tablet TAKE 1 TABLET BY MOUTH 4 (FOUR) TIMES A DAY BEFORE MEALS & AT BEDTIME. 360 tablet 3   • nystatin (MYCOSTATIN) 832419 UNIT/GM powder Apply  topically to the appropriate area as directed Every 12 (Twelve) Hours.     • OXYGEN-HELIUM IN 2 L into the nostril(s) as directed by provider As Needed.      • pravastatin (PRAVACHOL) 20 MG tablet Take 1 tablet by mouth Daily. 90 tablet 1   • sennosides-docusate (PERICOLACE) 8.6-50 MG per tablet Take 2 tablets by mouth 2 (Two) Times a Day.     • silodosin (RAPAFLO) 8 MG capsule capsule Take 1 capsule by mouth daily. With food.     • sulfamethoxazole-trimethoprim (BACTRIM,SEPTRA) 400-80 MG tablet Take 1 tablet by mouth 2 (Two) Times a Day.     • warfarin (COUMADIN) 5 MG tablet Take 5 mg daily while on Bactrim therapy.  Indications: Atrial Fibrillation       No facility-administered medications prior to visit.       Patient Active Problem List   Diagnosis   • Chronic osteomyelitis (HCC)   • Foot pain   • Peripheral neuropathy   • Lymphedema of both lower extremities   • Permanent atrial fibrillation (HCC)   • Sleep apnea   • Chronic edema   • Obesity, Class III, BMI 40-49.9 (morbid obesity) (HCC)   • COPD (chronic obstructive pulmonary disease) (HCC)   • Atrial flutter (HCC)   • Tachycardia induced cardiomyopathy (HCC)   • Aortectasia (HCC)   • Popliteal artery aneurysm (HCC)   • Colon polyps   • Gastroparesis   • Insomnia   • Adenomatous polyp of colon   • Essential hypertension   • ETOH abuse   • Chronic anticoagulation   • Oropharyngeal dysphagia   • Tobacco abuse   • Thyromegaly   • Adrenal adenoma, left   • Retroperitoneal lymphadenopathy   • Anemia of chronic disease   • Thyroid nodule   • Charcot's joint of foot   • Abnormal CT scan, lumbar spine   • Alcohol dependence, in remission (HCC)   • Ischemic cardiomyopathy   • Cellulitis of lower extremity   • Normocytic anemia   • Inguinal adenopathy   • Generalized weakness   • Fever in adult   • Elevated lactic acid level       Advance Care Planning:  ACP discussion was held with the patient during this visit. Patient has an advance directive (not in EMR), copy requested.    Identification of Risk Factors:  Risk factors include: Cardiovascular risk  Chronic Pain   Fall  "Risk  Inactivity/Sedentary  Polypharmacy.    Review of Systems   Constitutional: Positive for activity change and fatigue. Negative for appetite change, chills, diaphoresis and unexpected weight change.   HENT: Negative for congestion, sinus pressure, sinus pain, sneezing and sore throat.    Eyes: Negative for pain, discharge and itching.   Respiratory: Negative for cough, shortness of breath and wheezing.    Cardiovascular: Negative for chest pain, palpitations and leg swelling.   Gastrointestinal: Negative for abdominal distention, abdominal pain, anal bleeding, blood in stool, constipation, diarrhea, nausea, rectal pain and vomiting.   Endocrine: Negative for cold intolerance and heat intolerance.   Genitourinary: Negative for dysuria and hematuria.   Musculoskeletal: Positive for arthralgias and back pain. Negative for neck stiffness.   Skin: Positive for rash and wound. Negative for color change and pallor.   Allergic/Immunologic: Positive for environmental allergies. Negative for food allergies and immunocompromised state.   Neurological: Negative for dizziness, tremors, speech difficulty and headaches.   Hematological: Negative for adenopathy. Does not bruise/bleed easily.   Psychiatric/Behavioral: Negative for agitation, behavioral problems and suicidal ideas. The patient is not nervous/anxious.        Compared to one year ago, the patient feels his physical health is worse.  Compared to one year ago, the patient feels his mental health is the same.    Objective     [unfilled]    Vitals:    01/21/22 1302   BP: 130/78   BP Location: Left arm   Patient Position: Sitting   Cuff Size: Large Adult   Pulse: 100   Temp: 98.4 °F (36.9 °C)   TempSrc: Infrared   SpO2: 99%   Weight: (!) 143 kg (314 lb 3.2 oz)   Height: 182.9 cm (72\")   PainSc:   4   PainLoc: Foot       Patient's Body mass index is 42.61 kg/m². indicating that he is morbidly obese (BMI > 40 or > 35 with obesity - related health condition). " Obesity-related health conditions include the following: hypertension, coronary heart disease, dyslipidemias and peripheral vascular disease. Obesity is improving with treatment. BMI is is above average; BMI management plan is completed. We discussed portion control and increasing exercise..      Assessment/Plan   Patient Self-Management and Personalized Health Advice  The patient has been provided with information about: diet, weight management and fall prevention and preventive services including:   · Annual Wellness Visit (AWV).    Visit Diagnoses:    ICD-10-CM ICD-9-CM   1. Cellulitis of lower extremity, unspecified laterality  L03.119 682.6   2. Permanent atrial fibrillation (HCC)  I48.21 427.31   3. Essential hypertension  I10 401.9   4. Charcot's joint of foot, unspecified laterality  M14.679 349.9     713.5   5. Chronic obstructive pulmonary disease, unspecified COPD type (HCC)  J44.9 496   6. Lymphedema of both lower extremities  I89.0 457.1   7. Chronic edema  R60.9 782.3   8. Generalized weakness  R53.1 780.79   9. Tobacco abuse  Z72.0 305.1       No orders of the defined types were placed in this encounter.      Outpatient Encounter Medications as of 1/21/2022   Medication Sig Dispense Refill   • acidophilus (FLORANEX) tablet tablet      • albuterol sulfate  (90 Base) MCG/ACT inhaler INHALE 2 PUFFS BY MOUTH EVERY 4 HOURS AS NEEDED FOR WHEEZE 2 g 6   • ALPRAZolam (XANAX) 0.5 MG tablet Take 1 tablet by mouth At Night As Needed for Anxiety. 30 tablet 1   • bacitracin 500 UNIT/GM ointment Apply 1 application topically to the appropriate area as directed 2 (Two) Times a Day. 14 g 0   • Breo Ellipta 200-25 MCG/INH inhaler INHALE 1 PUFF BY MOUTH EVERY DAY 60 each 4   • carvedilol (COREG) 6.25 MG tablet Take 1 tablet by mouth 2 (Two) Times a Day With Meals. 180 tablet 3   • docusate sodium (COLACE) 100 MG capsule Take 100 mg by mouth Daily.     • finasteride (PROSCAR) 5 MG tablet Take 1 tablet by mouth  daily.     • furosemide (LASIX) 40 MG tablet Take 40 mg by mouth Daily.     • gabapentin (NEURONTIN) 600 MG tablet Take 600 mg by mouth 3 (Three) Times a Day.     • HYDROcodone-acetaminophen (NORCO)  MG per tablet Take 1 tablet by mouth Every 6 (Six) Hours As Needed for Moderate Pain . 6 tablet 0   • hydrocortisone-bacitracin-zinc oxide-nystatin (MAGIC BARRIER) Apply 1 application topically to the appropriate area as directed 2 (Two) Times a Day. 60 g 0   • ipratropium (Atrovent HFA) 17 MCG/ACT inhaler Inhale 2 puffs 4 (Four) Times a Day. 12.9 each 3   • metoclopramide (REGLAN) 10 MG tablet TAKE 1 TABLET BY MOUTH 4 (FOUR) TIMES A DAY BEFORE MEALS & AT BEDTIME. 360 tablet 3   • nystatin (MYCOSTATIN) 167050 UNIT/GM powder Apply  topically to the appropriate area as directed Every 12 (Twelve) Hours.     • OXYGEN-HELIUM IN 2 L into the nostril(s) as directed by provider As Needed.     • pravastatin (PRAVACHOL) 20 MG tablet Take 1 tablet by mouth Daily. 90 tablet 1   • sennosides-docusate (PERICOLACE) 8.6-50 MG per tablet Take 2 tablets by mouth 2 (Two) Times a Day.     • silodosin (RAPAFLO) 8 MG capsule capsule Take 1 capsule by mouth daily. With food.     • sulfamethoxazole-trimethoprim (BACTRIM,SEPTRA) 400-80 MG tablet Take 1 tablet by mouth 2 (Two) Times a Day.     • warfarin (COUMADIN) 5 MG tablet Take 5 mg daily while on Bactrim therapy.  Indications: Atrial Fibrillation       No facility-administered encounter medications on file as of 1/21/2022.       Reviewed use of high risk medication in the elderly: yes  Reviewed for potential of harmful drug interactions in the elderly: yes    Follow Up:  Return in about 3 months (around 4/21/2022).     An After Visit Summary and PPPS with all of these plans were given to the patient.

## 2022-02-01 ENCOUNTER — OFFICE VISIT (OUTPATIENT)
Dept: OBSTETRICS AND GYNECOLOGY | Facility: CLINIC | Age: 57
End: 2022-02-01
Payer: COMMERCIAL

## 2022-02-01 ENCOUNTER — TELEPHONE (OUTPATIENT)
Dept: OBSTETRICS AND GYNECOLOGY | Facility: CLINIC | Age: 57
End: 2022-02-01

## 2022-02-01 ENCOUNTER — HOSPITAL ENCOUNTER (OUTPATIENT)
Dept: RADIOLOGY | Facility: HOSPITAL | Age: 57
Discharge: HOME | End: 2022-02-01
Attending: OBSTETRICS & GYNECOLOGY
Payer: COMMERCIAL

## 2022-02-01 VITALS
DIASTOLIC BLOOD PRESSURE: 82 MMHG | WEIGHT: 175.63 LBS | BODY MASS INDEX: 32.32 KG/M2 | RESPIRATION RATE: 15 BRPM | HEART RATE: 76 BPM | HEIGHT: 62 IN | SYSTOLIC BLOOD PRESSURE: 104 MMHG

## 2022-02-01 DIAGNOSIS — Z01.419 WELL WOMAN EXAM WITH ROUTINE GYNECOLOGICAL EXAM: Primary | ICD-10-CM

## 2022-02-01 DIAGNOSIS — Z12.31 ENCOUNTER FOR SCREENING MAMMOGRAM FOR MALIGNANT NEOPLASM OF BREAST: ICD-10-CM

## 2022-02-01 DIAGNOSIS — Z12.4 SCREENING FOR CERVICAL CANCER: ICD-10-CM

## 2022-02-01 DIAGNOSIS — N81.6 RECTOCELE: ICD-10-CM

## 2022-02-01 DIAGNOSIS — N84.1 CERVICAL POLYP: ICD-10-CM

## 2022-02-01 PROCEDURE — 99999 PR PBB SHADOW E&M-EST. PATIENT-LVL IV: ICD-10-PCS | Mod: PBBFAC,,, | Performed by: OBSTETRICS & GYNECOLOGY

## 2022-02-01 PROCEDURE — 77067 SCR MAMMO BI INCL CAD: CPT | Mod: 26,,, | Performed by: RADIOLOGY

## 2022-02-01 PROCEDURE — 3079F DIAST BP 80-89 MM HG: CPT | Mod: CPTII,S$GLB,, | Performed by: OBSTETRICS & GYNECOLOGY

## 2022-02-01 PROCEDURE — 3008F BODY MASS INDEX DOCD: CPT | Mod: CPTII,S$GLB,, | Performed by: OBSTETRICS & GYNECOLOGY

## 2022-02-01 PROCEDURE — 77067 SCR MAMMO BI INCL CAD: CPT | Mod: TC

## 2022-02-01 PROCEDURE — 3008F PR BODY MASS INDEX (BMI) DOCUMENTED: ICD-10-PCS | Mod: CPTII,S$GLB,, | Performed by: OBSTETRICS & GYNECOLOGY

## 2022-02-01 PROCEDURE — 77063 MAMMO DIGITAL SCREENING BILAT WITH TOMO: ICD-10-PCS | Mod: 26,,, | Performed by: RADIOLOGY

## 2022-02-01 PROCEDURE — 3074F SYST BP LT 130 MM HG: CPT | Mod: CPTII,S$GLB,, | Performed by: OBSTETRICS & GYNECOLOGY

## 2022-02-01 PROCEDURE — 3074F PR MOST RECENT SYSTOLIC BLOOD PRESSURE < 130 MM HG: ICD-10-PCS | Mod: CPTII,S$GLB,, | Performed by: OBSTETRICS & GYNECOLOGY

## 2022-02-01 PROCEDURE — 77067 MAMMO DIGITAL SCREENING BILAT WITH TOMO: ICD-10-PCS | Mod: 26,,, | Performed by: RADIOLOGY

## 2022-02-01 PROCEDURE — 99999 PR PBB SHADOW E&M-EST. PATIENT-LVL IV: CPT | Mod: PBBFAC,,, | Performed by: OBSTETRICS & GYNECOLOGY

## 2022-02-01 PROCEDURE — 99386 PREV VISIT NEW AGE 40-64: CPT | Mod: S$GLB,,, | Performed by: OBSTETRICS & GYNECOLOGY

## 2022-02-01 PROCEDURE — 1159F MED LIST DOCD IN RCRD: CPT | Mod: CPTII,S$GLB,, | Performed by: OBSTETRICS & GYNECOLOGY

## 2022-02-01 PROCEDURE — 99386 PR PREVENTIVE VISIT,NEW,40-64: ICD-10-PCS | Mod: S$GLB,,, | Performed by: OBSTETRICS & GYNECOLOGY

## 2022-02-01 PROCEDURE — 77063 BREAST TOMOSYNTHESIS BI: CPT | Mod: 26,,, | Performed by: RADIOLOGY

## 2022-02-01 PROCEDURE — 3079F PR MOST RECENT DIASTOLIC BLOOD PRESSURE 80-89 MM HG: ICD-10-PCS | Mod: CPTII,S$GLB,, | Performed by: OBSTETRICS & GYNECOLOGY

## 2022-02-01 PROCEDURE — 1159F PR MEDICATION LIST DOCUMENTED IN MEDICAL RECORD: ICD-10-PCS | Mod: CPTII,S$GLB,, | Performed by: OBSTETRICS & GYNECOLOGY

## 2022-02-01 PROCEDURE — 77063 BREAST TOMOSYNTHESIS BI: CPT | Mod: TC

## 2022-02-01 PROCEDURE — 87624 HPV HI-RISK TYP POOLED RSLT: CPT | Performed by: OBSTETRICS & GYNECOLOGY

## 2022-02-01 PROCEDURE — 88175 CYTOPATH C/V AUTO FLUID REDO: CPT | Performed by: OBSTETRICS & GYNECOLOGY

## 2022-02-01 NOTE — PROGRESS NOTES
Subjective:    Patient ID: Marylu Augustin is a 56 y.o. y.o. female.     Chief Complaint: Annual Well Woman Exam     History of Present Illness:  Marylu presents today for Annual Well Woman exam. She describes her menses as absent.She denies pelvic pain.  She denies breast tenderness, masses, nipple discharge. She denies difficulty with urination or bowel movements. She denies menopausal symptoms such as hotflashes, vaginal dryness, and night sweats. She denies bloating, early satiety, or weight changes. She is sexually active. Contraception is by no method.    Patient reports having a bulge in her vagina that is worsening.     The following portions of the patient's history were reviewed and updated as appropriate: allergies, current medications, past family history, past medical history, past social history, past surgical history and problem list.      Menstrual History:   No LMP recorded. Patient is postmenopausal..     OB History        1    Para   1    Term   1       0    AB   0    Living   1       SAB   0    IAB   0    Ectopic   0    Multiple   0    Live Births                     The following portions of the patient's history were reviewed and updated as appropriate: allergies, current medications, past family history, past medical history, past social history, past surgical history and problem list.        ROS:     Review of Systems   Constitutional: Negative for activity change, appetite change, chills, diaphoresis, fatigue, fever and unexpected weight change.   HENT: Negative for mouth sores and tinnitus.    Eyes: Negative for discharge and visual disturbance.   Respiratory: Negative for cough, shortness of breath and wheezing.    Cardiovascular: Negative for chest pain, palpitations and leg swelling.   Gastrointestinal: Negative for abdominal pain, bloating, blood in stool, constipation, diarrhea, nausea and vomiting.   Endocrine: Negative for diabetes, hair loss, hot flashes,  "hyperthyroidism and hypothyroidism.   Genitourinary: Negative for dysuria, flank pain, frequency, genital sores, hematuria, urgency, vaginal bleeding, vaginal discharge, vaginal pain, postcoital bleeding and vaginal odor.   Musculoskeletal: Negative for back pain, joint swelling and myalgias.   Integumentary:  Negative for rash, acne, breast mass, nipple discharge and breast skin changes.   Neurological: Negative for seizures, syncope, numbness and headaches.   Hematological: Negative for adenopathy. Does not bruise/bleed easily.   Psychiatric/Behavioral: Negative for depression and sleep disturbance. The patient is not nervous/anxious.    Breast: Negative for mass, mastodynia, nipple discharge and skin changes      Objective:    Vital Signs:  Vitals:    02/01/22 1319   BP: 104/82   Pulse: 76   Resp: 15   Weight: 79.7 kg (175 lb 9.6 oz)   Height: 5' 2" (1.575 m)         Physical Exam:  General:  alert, cooperative, appears stated age   Skin:  Skin color, texture, turgor normal. No rashes or lesions   HEENT:  conjunctivae/corneas clear. PERRL.   Neck: supple, trachea midline, no adenopathy or thyromegally   Respiratory:  clear to auscultation bilaterally   Heart:  regular rate and rhythm, S1, S2 normal, no murmur, click, rub or gallop   Breasts:  no discharge, erythema, or tenderness   Abdomen:  normal findings: bowel sounds normal, no masses palpable, no organomegaly and soft, non-tender   Pelvis: External genitalia: normal general appearance  Urinary system: urethral meatus normal, bladder nontender  Vaginal: atrophic mucosa, rectocele present, stage 2  Cervix: normal appearance, mass, cervical polyp noted  Uterus: normal size, shape, position  Adnexa: normal size, nontender bilaterally   Extremities: Normal ROM; no edema, no cyanosis   Neurologial: Normal strength and tone. No focal numbness or weakness. Reflexes 2+ and equal.   Psychiatric: normal mood, speech, dress, and thought processes         Assessment:  "      Healthy female exam.     1. Well woman exam with routine gynecological exam    2. Screening for cervical cancer    3. Encounter for screening mammogram for malignant neoplasm of breast    4. Rectocele    5. Cervical polyp          Plan:       Thin prep Pap smear.    HPV cotesting  MMG   Colonoscopy UTD; due in 2031  Referral for urogyn; patient desires surgical repair  RTC for polyp removal    COUNSELING:  Marylu was counseled on STD pevention, use and side-effects of various contraceptive measures, A.C.O.G. Pap guidelines and recommendations for yearly pelvic exams in addition to recommendations for monthly self breast exams; to see her PCP for other health maintenance.

## 2022-02-01 NOTE — TELEPHONE ENCOUNTER
Good afternoon, Dr. Houston has placed a referral in the computer for this patient, please contact pt to schedule. Thank you.

## 2022-02-08 LAB
FINAL PATHOLOGIC DIAGNOSIS: NORMAL
HPV HR 12 DNA SPEC QL NAA+PROBE: NEGATIVE
HPV16 AG SPEC QL: NEGATIVE
HPV18 DNA SPEC QL NAA+PROBE: NEGATIVE
Lab: NORMAL

## 2022-02-14 ENCOUNTER — OFFICE VISIT (OUTPATIENT)
Dept: UROGYNECOLOGY | Facility: CLINIC | Age: 57
End: 2022-02-14
Payer: COMMERCIAL

## 2022-02-14 VITALS — BODY MASS INDEX: 32.12 KG/M2 | HEIGHT: 62 IN

## 2022-02-14 DIAGNOSIS — N81.2 INCOMPLETE UTEROVAGINAL PROLAPSE: Primary | ICD-10-CM

## 2022-02-14 DIAGNOSIS — N81.6 RECTOCELE: ICD-10-CM

## 2022-02-14 DIAGNOSIS — N39.41 URGENCY INCONTINENCE: ICD-10-CM

## 2022-02-14 DIAGNOSIS — Z01.818 PRE-OP TESTING: ICD-10-CM

## 2022-02-14 PROCEDURE — 3008F BODY MASS INDEX DOCD: CPT | Mod: CPTII,S$GLB,, | Performed by: OBSTETRICS & GYNECOLOGY

## 2022-02-14 PROCEDURE — 99214 OFFICE O/P EST MOD 30 MIN: CPT | Mod: S$GLB,,, | Performed by: OBSTETRICS & GYNECOLOGY

## 2022-02-14 PROCEDURE — 99999 PR PBB SHADOW E&M-EST. PATIENT-LVL IV: ICD-10-PCS | Mod: PBBFAC,,, | Performed by: OBSTETRICS & GYNECOLOGY

## 2022-02-14 PROCEDURE — 99999 PR PBB SHADOW E&M-EST. PATIENT-LVL IV: CPT | Mod: PBBFAC,,, | Performed by: OBSTETRICS & GYNECOLOGY

## 2022-02-14 PROCEDURE — 3008F PR BODY MASS INDEX (BMI) DOCUMENTED: ICD-10-PCS | Mod: CPTII,S$GLB,, | Performed by: OBSTETRICS & GYNECOLOGY

## 2022-02-14 PROCEDURE — 99214 PR OFFICE/OUTPT VISIT, EST, LEVL IV, 30-39 MIN: ICD-10-PCS | Mod: S$GLB,,, | Performed by: OBSTETRICS & GYNECOLOGY

## 2022-02-14 PROCEDURE — 4010F ACE/ARB THERAPY RXD/TAKEN: CPT | Mod: CPTII,S$GLB,, | Performed by: OBSTETRICS & GYNECOLOGY

## 2022-02-14 PROCEDURE — 4010F PR ACE/ARB THEARPY RXD/TAKEN: ICD-10-PCS | Mod: CPTII,S$GLB,, | Performed by: OBSTETRICS & GYNECOLOGY

## 2022-02-14 PROCEDURE — 1159F MED LIST DOCD IN RCRD: CPT | Mod: CPTII,S$GLB,, | Performed by: OBSTETRICS & GYNECOLOGY

## 2022-02-14 PROCEDURE — 1159F PR MEDICATION LIST DOCUMENTED IN MEDICAL RECORD: ICD-10-PCS | Mod: CPTII,S$GLB,, | Performed by: OBSTETRICS & GYNECOLOGY

## 2022-02-14 NOTE — PROGRESS NOTES
Subjective:      Patient ID: Marylu Augustin is a 56 y.o. female.    Chief Complaint:  Consult, Vaginal Prolapse, and Rectocele      History of Present Illness  56-year-old para 1001 patient with longstanding history of prolapse doubt got to where it is very bothersome patient is referred secondary posterior          Past Medical History:   Diagnosis Date    Diabetes mellitus     Hyperlipidemia     Hypertension     Lumbar radiculopathy     Mental disorder     anxiety    Right sciatic nerve pain        Past Surgical History:   Procedure Laterality Date    ANTERIOR LUMBAR FUSION W/ FRA      L5 S1    BACK SURGERY  3/5/2014    Cleaned herniated disk L4 and L5    CHOLECYSTECTOMY      COLONOSCOPY      Repeat in 10 years    ENDOMETRIAL ABLATION      D&C - for heavy periods    KNEE ARTHROSCOPY Bilateral     Meniscus repair       GYN & OB History  No LMP recorded. Patient is postmenopausal.   Date of Last Pap: 2022    OB History    Para Term  AB Living   1 1 1 0 0 1   SAB IAB Ectopic Multiple Live Births   0 0 0 0        # Outcome Date GA Lbr Zac/2nd Weight Sex Delivery Anes PTL Lv   1 Term                Health Maintenance       Date Due Completion Date    Hepatitis C Screening Never done ---    Lipid Panel Never done ---    COVID-19 Vaccine (1) Never done ---    HIV Screening Never done ---    TETANUS VACCINE Never done ---    Mammogram Never done ---    Shingles Vaccine (1 of 2) Never done ---    Influenza Vaccine (1) 2021    Pap Smear 2024    Colorectal Cancer Screening 2031 (Done)    Override on 2021: Done ([facility]          Family History   Problem Relation Age of Onset    Hypertension Father     Diabetes Father     Hypertension Mother     Diabetes Mother     Breast cancer Mother        Social History     Socioeconomic History    Marital status:    Tobacco Use    Smoking status: Never Smoker     "Smokeless tobacco: Never Used   Substance and Sexual Activity    Alcohol use: No    Drug use: No    Sexual activity: Yes     Partners: Male     Birth control/protection: Surgical     Comment:        Review of Systems  Review of Systems   All other systems reviewed and are negative.    Bulge pressure     Objective:   Ht 5' 2" (1.575 m)   BMI 32.12 kg/m²     Physical Exam   Stage III posterior compartment defect with apical involvement  Assessment:     1. Incomplete uterovaginal prolapse    2. Rectocele    3. Urgency incontinence            Plan:     1. Incomplete uterovaginal prolapse    2. Rectocele    3. Urgency incontinence      Long discussion with the patient patient's ..      And after review of the their anatomy in relation to normal anatomy we then went forward discussed all options native tissue repair site specific to comprehensive global repaired after long discussion patient is move forward with review the request for posterior compartment stabilization with apical fixation form of robotic sacral colpopexy with deep dissection into rectovaginal space all discussed will be moving forward with this on April 25th patient.    Going to go ahead and add a obtain urodynamics and cystoscopy secondary to the level of urinary urgency and frequency.    There are no Patient Instructions on file for this visit.    Expectant Management:  Patient understands we will continue to monitor her level of anatomic distortion without any type of active intervention until a time where symptomatology can no longer be tolerated by patient and she wishes to have active management.    Conservative Therapy:  Patient understands she will be utilizing a a supportive device within the vagina which will need maintenance.  The clinic will support her in her maintenance of the pessary.  Patient understands this is an active process which will need her input to maintain the pessary properly.     Surgical Management:  "

## 2022-02-25 ENCOUNTER — HOSPITAL ENCOUNTER (OUTPATIENT)
Dept: RADIOLOGY | Facility: HOSPITAL | Age: 57
Discharge: HOME | End: 2022-02-25
Attending: NURSE PRACTITIONER
Payer: COMMERCIAL

## 2022-02-25 DIAGNOSIS — M54.50 LOW BACK PAIN: ICD-10-CM

## 2022-02-25 PROCEDURE — 72220 X-RAY EXAM SACRUM TAILBONE: CPT | Mod: TC

## 2022-02-25 PROCEDURE — 72220 X-RAY EXAM SACRUM TAILBONE: CPT | Mod: 26,,, | Performed by: RADIOLOGY

## 2022-02-25 PROCEDURE — 72220 XR SACRUM AND COCCYX: ICD-10-PCS | Mod: 26,,, | Performed by: RADIOLOGY

## 2022-02-25 PROCEDURE — 72100 XR LUMBAR SPINE AP AND LATERAL: ICD-10-PCS | Mod: 26,,, | Performed by: RADIOLOGY

## 2022-02-25 PROCEDURE — 72100 X-RAY EXAM L-S SPINE 2/3 VWS: CPT | Mod: 26,,, | Performed by: RADIOLOGY

## 2022-02-25 PROCEDURE — 72100 X-RAY EXAM L-S SPINE 2/3 VWS: CPT | Mod: TC

## 2022-03-11 ENCOUNTER — HOSPITAL ENCOUNTER (OUTPATIENT)
Dept: RADIOLOGY | Facility: HOSPITAL | Age: 57
Discharge: HOME | End: 2022-03-11
Attending: FAMILY MEDICINE
Payer: COMMERCIAL

## 2022-03-11 ENCOUNTER — HOSPITAL ENCOUNTER (OUTPATIENT)
Dept: RADIOLOGY | Facility: HOSPITAL | Age: 57
Discharge: HOME | End: 2022-03-11
Attending: NURSE PRACTITIONER
Payer: COMMERCIAL

## 2022-03-11 DIAGNOSIS — E04.1 NONTOXIC SINGLE THYROID NODULE: ICD-10-CM

## 2022-03-11 DIAGNOSIS — S32.10XA UNSPECIFIED FRACTURE OF SACRUM, INITIAL ENCOUNTER FOR CLOSED FRACTURE: ICD-10-CM

## 2022-03-11 PROCEDURE — 72195 MRI PELVIS W/O DYE: CPT | Mod: TC

## 2022-03-11 PROCEDURE — 72195 MRI PELVIS W/O DYE: CPT | Mod: 26,,, | Performed by: RADIOLOGY

## 2022-03-11 PROCEDURE — 76536 US EXAM OF HEAD AND NECK: CPT | Mod: 26,,, | Performed by: RADIOLOGY

## 2022-03-11 PROCEDURE — 76536 US THYROID: ICD-10-PCS | Mod: 26,,, | Performed by: RADIOLOGY

## 2022-03-11 PROCEDURE — 76536 US EXAM OF HEAD AND NECK: CPT | Mod: TC

## 2022-03-11 PROCEDURE — 72195 MRI SACRUM/COCCYX (BONY) W/O CONTRAST: ICD-10-PCS | Mod: 26,,, | Performed by: RADIOLOGY

## 2022-04-04 ENCOUNTER — PROCEDURE VISIT (OUTPATIENT)
Dept: UROGYNECOLOGY | Facility: CLINIC | Age: 57
End: 2022-04-04
Payer: COMMERCIAL

## 2022-04-04 VITALS
BODY MASS INDEX: 32.25 KG/M2 | HEIGHT: 62 IN | DIASTOLIC BLOOD PRESSURE: 80 MMHG | WEIGHT: 175.25 LBS | SYSTOLIC BLOOD PRESSURE: 128 MMHG

## 2022-04-04 DIAGNOSIS — N39.41 URGENCY INCONTINENCE: ICD-10-CM

## 2022-04-04 DIAGNOSIS — N81.2 INCOMPLETE UTEROVAGINAL PROLAPSE: ICD-10-CM

## 2022-04-04 LAB
BILIRUB SERPL-MCNC: NORMAL MG/DL
BLOOD URINE, POC: NORMAL
CLARITY, POC UA: CLEAR
COLOR, POC UA: YELLOW
GLUCOSE UR QL STRIP: 1000
KETONES UR QL STRIP: NORMAL
LEUKOCYTE ESTERASE URINE, POC: NORMAL
NITRITE, POC UA: NORMAL
PH, POC UA: 5
PROTEIN, POC: NORMAL
SPECIFIC GRAVITY, POC UA: 1.01
UROBILINOGEN, POC UA: NORMAL

## 2022-04-04 PROCEDURE — 51728 PR COMPLEX CYSTOMETROGRAM VOIDING PRESSURE STUDIES: ICD-10-PCS | Mod: S$GLB,,, | Performed by: OBSTETRICS & GYNECOLOGY

## 2022-04-04 PROCEDURE — 51784 PR ANAL/URINARY MUSCLE STUDY: ICD-10-PCS | Mod: 51,S$GLB,, | Performed by: OBSTETRICS & GYNECOLOGY

## 2022-04-04 PROCEDURE — 81002 URINALYSIS NONAUTO W/O SCOPE: CPT | Mod: S$GLB,,, | Performed by: OBSTETRICS & GYNECOLOGY

## 2022-04-04 PROCEDURE — 51741 PR UROFLOWMETRY, COMPLEX: ICD-10-PCS | Mod: 51,S$GLB,, | Performed by: OBSTETRICS & GYNECOLOGY

## 2022-04-04 PROCEDURE — 81002 POCT URINE DIPSTICK WITHOUT MICROSCOPE: ICD-10-PCS | Mod: S$GLB,,, | Performed by: OBSTETRICS & GYNECOLOGY

## 2022-04-04 PROCEDURE — 52000 CYSTOURETHROSCOPY: CPT | Mod: 59,S$GLB,, | Performed by: OBSTETRICS & GYNECOLOGY

## 2022-04-04 PROCEDURE — 52000 PR CYSTOURETHROSCOPY: ICD-10-PCS | Mod: 59,S$GLB,, | Performed by: OBSTETRICS & GYNECOLOGY

## 2022-04-04 PROCEDURE — 51741 ELECTRO-UROFLOWMETRY FIRST: CPT | Mod: 51,S$GLB,, | Performed by: OBSTETRICS & GYNECOLOGY

## 2022-04-04 PROCEDURE — 51728 CYSTOMETROGRAM W/VP: CPT | Mod: S$GLB,,, | Performed by: OBSTETRICS & GYNECOLOGY

## 2022-04-04 PROCEDURE — 51784 ANAL/URINARY MUSCLE STUDY: CPT | Mod: 51,S$GLB,, | Performed by: OBSTETRICS & GYNECOLOGY

## 2022-04-04 PROCEDURE — 51797 INTRAABDOMINAL PRESSURE TEST: CPT | Mod: S$GLB,,, | Performed by: OBSTETRICS & GYNECOLOGY

## 2022-04-04 PROCEDURE — 51797 PR VOIDING PRESS STUDY INTRA-ABDOMINAL VOID: ICD-10-PCS | Mod: S$GLB,,, | Performed by: OBSTETRICS & GYNECOLOGY

## 2022-04-04 RX ORDER — ESZOPICLONE 2 MG/1
2 TABLET, FILM COATED ORAL NIGHTLY
COMMUNITY

## 2022-04-04 RX ORDER — LIDOCAINE HYDROCHLORIDE 20 MG/ML
JELLY TOPICAL ONCE
Status: COMPLETED | OUTPATIENT
Start: 2022-04-04 | End: 2022-04-04

## 2022-04-04 RX ORDER — CIPROFLOXACIN 500 MG/1
500 TABLET ORAL
Status: COMPLETED | OUTPATIENT
Start: 2022-04-04 | End: 2022-04-04

## 2022-04-04 RX ADMIN — CIPROFLOXACIN 500 MG: 500 TABLET ORAL at 02:04

## 2022-04-04 RX ADMIN — LIDOCAINE HYDROCHLORIDE 5 ML: 20 JELLY TOPICAL at 02:04

## 2022-04-04 NOTE — PROCEDURES
Procedures         SURGEONS NARRATIVE:  A time out was performed in which the patient identity and procedure were confirmed.  Urodynamic evaluation was performed using a computerized system ([employer], [employer]  Uroflowmetry was performed on the patient in the sitting position without catheters in place.  Subsequent urodynamic testing was performed with the patient in the lithotomy position at 45 degrees. Air charged catheters were used with sterile water as the infusion medium. Vesical and abdominal (rectal) pressures were measured, and detrusor pressure was calculated. EMG activity was recorded with surface electrodes. During filling, room temperature sterile water was infused at a rate of 30 cubic centimeters per minute. The patient was asked cough after instillation of each 100cc volume. Two Valsalva leak point pressures and two cough leak point pressures were performed with the catheters in place at 300 cubic centimeters and again at maximum capacity. Valsalva leak point pressure was defined as the difference between vesical pressure at which leakage was noted (visualized at the external urethral meatus) and the baseline vesical pressure. Following urodynamic testing, a pressure flow study was performed with the patient in the sitting position. Vesical and abdominal pressures were monitored and detrusor pressures were calculated. After the pressure flow study, the catheters were then removed. The patient tolerated the procedure well.     Urine dipstick:  Negative    1.  VOIDING PHASE:      a.  Uroflowmetry:   Prolapse reduction: No   Voided volume:  33 mL    Voiding time:   18.1 seconds   Max flow:  4.10 mL/s   Avg flow:   1.80 mL/s    PVR:   25 mL    The overall configuration of this uroflow study was  with such small amount will not going to take this under consideration..      b.  Pressure flow:   Prolapse reduction: No   Voided volume:    mL   Voiding time:    seconds   Peak flow:   mL/s     Avg flow:   mL/s   Max det pressure:    cm H20   Det pressure at max flow:  cm H20   Void initiated by  .     Urethral relaxation (EMG):  .     PVR (calculated):  200 mL    The overall configuration of this pressure flow study was patient went to the restroom for I was able to void about 300 show and 40 she left postvoid of 200 behind     2.  FILLING PHASE:   1st desire: 153 mL   Normal desire:  179 mL   Strong desire:  228 mL   Urgency:  548 mL   Compliance (calculated)  compliant mL/cm H20   EMG activity during filling:   normal   Detrusor contractions observed: No.      3.  URETHRAL FUNCTION/STORAGE PHASE:    a.  WITHOUT prolapse reduction:   No leakage of urine seen at any point    These findings are consistent with Negative urodynamic stress incontinence .    Assessment:  No DOE seen on UDS.  No spontaneous detrusor contraction  Suspected profound voiding dysfunction  Plan:  Move to so cystoscopy      Title of Operation:   Cystourethroscopy.     INDICATIONS:  Substantial prolapse    PREOPERATIVE DIAGNOSIS  Incomplete uterovaginal prolapse    POSTOPERATIVE DIAGNOSIS:   Same    Anesthesia:   2% Xylocaine gel.    Specimen (Bacteriological, Pathological or other):   None.     Prosthetic Device/Implant:   None.     Surgeons Narrative:     After informed consent was obtained, the patient was placed in the lithotomy position. The urethral meatus was prepped with Betadine and 10 cubic centimeters of 2% Xylocaine gel were introduced into the urethra. A flexible cystourethroscope was introduced into the bladder. The bladder was distended with approximately 300 cubic centimeters of sterile water. A systematic survey was performed in which the bladder was surveyed using multiple sequential passes in a clockwise fashion from the bladder dome to the bladder base to the urethrovesical junction. The trigone and ureteral orifices were observed. The scope was then flipped back on itself, and the urethrovesical  junction was viewed. A vaginal examining finger was then placed with pressure suburethrally at the urethrovesical junction as the telescope was withdrawn in order to perform positive pressure urethroscopy.  Standard maneuvers of cough, squeeze and Valsalva were performed. The telescope was then completely withdrawn.     Findings: Urethroscopy:  Normal.  Cystoscopy:  Normal bladder mucosa, bilateral ureteral flow was noted.      Assessment: Essentially normal cystourethroscopy.        Plan:  Were going to move forward with a reconstruction only there will be no concomitant sling absolutely no concomitant sling at the time patient profound voiding dysfunction no concomitant sling just sacral colpopexy.      Reviewed every aspect of the surgery with the patient and patient's  informed consents obtained

## 2022-04-19 ENCOUNTER — TELEPHONE (OUTPATIENT)
Dept: UROGYNECOLOGY | Facility: CLINIC | Age: 57
End: 2022-04-19
Payer: COMMERCIAL

## 2022-04-19 NOTE — TELEPHONE ENCOUNTER
Spoke to pt. Regarding clearance for upcoming sx(4/25/22) pt.stated she saw her PCP last week and was verbally told she was cleared for sx. I asked pt. To contact PCP's office and have them fax over clearance to [phone]. Pt. verbalized understanding

## 2022-04-21 ENCOUNTER — TELEPHONE (OUTPATIENT)
Dept: UROGYNECOLOGY | Facility: CLINIC | Age: 57
End: 2022-04-21
Payer: COMMERCIAL

## 2022-04-21 DIAGNOSIS — N81.2 INCOMPLETE UTEROVAGINAL PROLAPSE: Primary | ICD-10-CM

## 2022-04-21 RX ORDER — LIDOCAINE HYDROCHLORIDE 20 MG/ML
JELLY TOPICAL ONCE
Status: CANCELLED | OUTPATIENT
Start: 2022-04-21 | End: 2022-04-21

## 2022-04-21 NOTE — TELEPHONE ENCOUNTER
Called pt's PCP Dr. Proctor to obtain clearance via fax.Verbal per office pt. Is cleared for sx and will fax clearance

## 2022-04-21 NOTE — TELEPHONE ENCOUNTER
Contacted office again to obtain clearance. Email and fax provided to office,will make another attempt

## 2022-04-22 ENCOUNTER — HOSPITAL ENCOUNTER (OUTPATIENT)
Dept: PREADMISSION TESTING | Facility: OTHER | Age: 57
Discharge: HOME | End: 2022-04-22
Attending: OBSTETRICS & GYNECOLOGY
Payer: COMMERCIAL

## 2022-04-22 ENCOUNTER — TELEPHONE (OUTPATIENT)
Dept: UROGYNECOLOGY | Facility: CLINIC | Age: 57
End: 2022-04-22
Payer: COMMERCIAL

## 2022-04-22 VITALS
SYSTOLIC BLOOD PRESSURE: 127 MMHG | OXYGEN SATURATION: 98 % | RESPIRATION RATE: 16 BRPM | HEART RATE: 90 BPM | WEIGHT: 171 LBS | TEMPERATURE: 97 F | HEIGHT: 62 IN | DIASTOLIC BLOOD PRESSURE: 67 MMHG | BODY MASS INDEX: 31.47 KG/M2

## 2022-04-22 DIAGNOSIS — Z01.818 PREOP TESTING: Primary | ICD-10-CM

## 2022-04-22 DIAGNOSIS — N81.2 INCOMPLETE UTEROVAGINAL PROLAPSE: ICD-10-CM

## 2022-04-22 LAB
ABO + RH BLD: NORMAL
ANION GAP SERPL CALC-SCNC: 10 MMOL/L (ref 8–16)
BASOPHILS # BLD AUTO: 0.04 K/UL (ref 0–0.2)
BASOPHILS NFR BLD: 0.5 % (ref 0–1.9)
BLD GP AB SCN CELLS X3 SERPL QL: NORMAL
BUN SERPL-MCNC: 21 MG/DL (ref 6–20)
CALCIUM SERPL-MCNC: 9.9 MG/DL (ref 8.7–10.5)
CHLORIDE SERPL-SCNC: 104 MMOL/L (ref 95–110)
CO2 SERPL-SCNC: 24 MMOL/L (ref 23–29)
CREAT SERPL-MCNC: 1 MG/DL (ref 0.5–1.4)
DIFFERENTIAL METHOD: NORMAL
EOSINOPHIL # BLD AUTO: 0.3 K/UL (ref 0–0.5)
EOSINOPHIL NFR BLD: 4 % (ref 0–8)
ERYTHROCYTE [DISTWIDTH] IN BLOOD BY AUTOMATED COUNT: 13.1 % (ref 11.5–14.5)
EST. GFR  (AFRICAN AMERICAN): >60 ML/MIN/1.73 M^2
EST. GFR  (NON AFRICAN AMERICAN): >60 ML/MIN/1.73 M^2
GLUCOSE SERPL-MCNC: 151 MG/DL (ref 70–110)
HCT VFR BLD AUTO: 45.9 % (ref 37–48.5)
HGB BLD-MCNC: 15 G/DL (ref 12–16)
IMM GRANULOCYTES # BLD AUTO: 0.03 K/UL (ref 0–0.04)
IMM GRANULOCYTES NFR BLD AUTO: 0.4 % (ref 0–0.5)
LYMPHOCYTES # BLD AUTO: 2 K/UL (ref 1–4.8)
LYMPHOCYTES NFR BLD: 26.7 % (ref 18–48)
MCH RBC QN AUTO: 30.4 PG (ref 27–31)
MCHC RBC AUTO-ENTMCNC: 32.7 G/DL (ref 32–36)
MCV RBC AUTO: 93 FL (ref 82–98)
MONOCYTES # BLD AUTO: 0.5 K/UL (ref 0.3–1)
MONOCYTES NFR BLD: 5.9 % (ref 4–15)
NEUTROPHILS # BLD AUTO: 4.7 K/UL (ref 1.8–7.7)
NEUTROPHILS NFR BLD: 62.5 % (ref 38–73)
NRBC BLD-RTO: 0 /100 WBC
PLATELET # BLD AUTO: 244 K/UL (ref 150–450)
PMV BLD AUTO: 11.4 FL (ref 9.2–12.9)
POTASSIUM SERPL-SCNC: 5.6 MMOL/L (ref 3.5–5.1)
RBC # BLD AUTO: 4.94 M/UL (ref 4–5.4)
SODIUM SERPL-SCNC: 138 MMOL/L (ref 136–145)
WBC # BLD AUTO: 7.59 K/UL (ref 3.9–12.7)

## 2022-04-22 PROCEDURE — 36415 COLL VENOUS BLD VENIPUNCTURE: CPT | Performed by: ANESTHESIOLOGY

## 2022-04-22 PROCEDURE — 80048 BASIC METABOLIC PNL TOTAL CA: CPT | Performed by: ANESTHESIOLOGY

## 2022-04-22 PROCEDURE — 86850 RBC ANTIBODY SCREEN: CPT | Performed by: OBSTETRICS & GYNECOLOGY

## 2022-04-22 PROCEDURE — 85025 COMPLETE CBC W/AUTO DIFF WBC: CPT | Performed by: ANESTHESIOLOGY

## 2022-04-22 PROCEDURE — 36415 COLL VENOUS BLD VENIPUNCTURE: CPT | Performed by: OBSTETRICS & GYNECOLOGY

## 2022-04-22 RX ORDER — SCOLOPAMINE TRANSDERMAL SYSTEM 1 MG/1
1 PATCH, EXTENDED RELEASE TRANSDERMAL ONCE
Status: CANCELLED | OUTPATIENT
Start: 2022-04-22 | End: 2022-04-22

## 2022-04-22 RX ORDER — SODIUM CHLORIDE, SODIUM LACTATE, POTASSIUM CHLORIDE, CALCIUM CHLORIDE 600; 310; 30; 20 MG/100ML; MG/100ML; MG/100ML; MG/100ML
INJECTION, SOLUTION INTRAVENOUS CONTINUOUS
Status: CANCELLED | OUTPATIENT
Start: 2022-04-22

## 2022-04-22 RX ORDER — ESCITALOPRAM OXALATE 10 MG/1
10 TABLET ORAL DAILY
COMMUNITY

## 2022-04-22 RX ORDER — LIDOCAINE HYDROCHLORIDE 10 MG/ML
0.5 INJECTION, SOLUTION EPIDURAL; INFILTRATION; INTRACAUDAL; PERINEURAL ONCE
Status: CANCELLED | OUTPATIENT
Start: 2022-04-22 | End: 2022-04-22

## 2022-04-22 RX ORDER — BUPROPION HYDROCHLORIDE 150 MG/1
TABLET ORAL DAILY
COMMUNITY

## 2022-04-22 RX ORDER — ACETAMINOPHEN 500 MG
1000 TABLET ORAL
Status: CANCELLED | OUTPATIENT
Start: 2022-04-22 | End: 2022-04-22

## 2022-04-22 NOTE — DISCHARGE INSTRUCTIONS
Information to Prepare you for your Surgery    PRE-ADMIT TESTING -  [phone]    [address] [facility]          Your surgery has been scheduled at [facility]. We are pleased to have the opportunity to serve you. For Further Information please call [phone].    On the day of surgery please report to the Information Desk on the 1st floor.    CONTACT YOUR PHYSICIAN'S OFFICE THE DAY PRIOR TO YOUR SURGERY TO OBTAIN YOUR ARRIVAL TIME.     The evening before surgery do not eat anything after 9 p.m. ( this includes hard candy, chewing gum and mints).  You may only  WATER  from 9 p.m. until you leave your home.   DO NOT DRINK ANY LIQUIDS ON THE WAY TO THE HOSPITAL.          Current Visitor policy(12/27/2021) - Patients may have 2 visitors pre and post procedure. Only 2 visitors will be allowed in the Surgical building with the patient.     SPECIAL MEDICATION INSTRUCTIONS: TAKE medications checked off by the Anesthesiologist on your Medication List.    Angiogram Patients: Take medications as instructed by your physician, including aspirin.     Surgery Patients:    If you take ASPIRIN - Your PHYSICIAN/SURGEON will need to inform you IF/OR when you need to stop taking aspirin prior to your surgery.     Do Not take any medications containing IBUPROFEN.    Do Not Wear any make-up (especially eye make-up) to surgery. Please remove any false eyelashes or eyelash extensions. If you arrive the day of surgery with makeup/eyelashes on you will be required to remove prior to surgery. (There is a risk of corneal abrasions if eye makeup/eyelash extensions are not removed)      Leave all valuables at home.   Do Not wear any jewelry or watches, including any metal in body piercings. Jewelry must be removed prior to coming to the hospital.  There is a possibility that rings that are unable to be removed may be cut off if they are on the surgical extremity.    Please remove all  hair extensions, wigs, clips and any other metal accessories/ ornaments from your hair.  These items may pose a flammable/fire risk in Surgery and must be removed.    Do not shave your surgical area at least 5 days prior to your surgery. The surgical prep will be performed at the hospital according to Infection Control regulations.    Contact Lens must be removed before surgery. Either do not wear the contact lens or bring a case and solution for storage.  Please bring a container for eyeglasses or dentures as required.  Bring any paperwork your physician has provided, such as consent forms,  history and physicals, doctor's orders, etc.   Bring comfortable clothes that are loose fitting to wear upon discharge. Take into consideration the type of surgery being performed.  Maintain your diet as advised per your physician the day prior to surgery.      Adequate rest the night before surgery is advised.   Park in the Parking lot behind the hospital or in the [facility] across the street from the parking lot. Parking is complimentary.  If you will be discharged the same day as your procedure, please arrange for a responsible adult to drive you home or to accompany you if traveling by taxi.   YOU WILL NOT BE PERMITTED TO DRIVE OR TO LEAVE THE HOSPITAL ALONE AFTER SURGERY.   If you are being discharged the same day, it is strongly recommended that you arrange for someone to remain with you for the first 24 hrs following your surgery.    The Surgeon will speak to your family/visitor after your surgery regarding the outcome of your surgery and post op care.  The Surgeon may speak to you after your surgery, but there is a possibility you may not remember the details.  Please check with your family members regarding the conversation with the Surgeon.    We strongly recommend whoever is bringing you home be present for discharge instructions.  This will ensure a thorough understanding for your post op home  care.    ALL CHILDREN MUST ALWAYS BE ACCOMPANIED BY AN ADULT.    Visitors-Refer to current Visitor policy handouts.    Thank you for your cooperation.  The Staff of [facility].            Bathing Instructions with Hibiclens    Shower the evening before and morning of your procedure with Hibiclens:  Wash your face with water and your regular face wash/soap  Apply Hibiclens directly on your skin or on a wet washcloth and wash gently. When showering: Move away from the shower stream when applying Hibiclens to avoid rinsing off too soon.  Rinse thoroughly with warm water  Do not dilute Hibiclens        Dry off as usual, do not use any deodorant, powder, body lotions, perfume, after shave or cologne.

## 2022-05-20 ENCOUNTER — HOSPITAL ENCOUNTER (OUTPATIENT)
Dept: RADIOLOGY | Facility: HOSPITAL | Age: 57
Discharge: HOME | End: 2022-05-20
Attending: NURSE PRACTITIONER
Payer: COMMERCIAL

## 2022-05-20 DIAGNOSIS — M79.602 PAIN OF LEFT ARM: ICD-10-CM

## 2022-05-20 DIAGNOSIS — M25.522 PAIN IN LEFT ELBOW: ICD-10-CM

## 2022-05-20 PROCEDURE — 73080 XR ELBOW COMPLETE 3 VIEW LEFT: ICD-10-PCS | Mod: 26,LT,, | Performed by: RADIOLOGY

## 2022-05-20 PROCEDURE — 73090 X-RAY EXAM OF FOREARM: CPT | Mod: 26,LT,, | Performed by: RADIOLOGY

## 2022-05-20 PROCEDURE — 73080 X-RAY EXAM OF ELBOW: CPT | Mod: TC,LT

## 2022-05-20 PROCEDURE — 73090 XR FOREARM LEFT: ICD-10-PCS | Mod: 26,LT,, | Performed by: RADIOLOGY

## 2022-05-20 PROCEDURE — 73080 X-RAY EXAM OF ELBOW: CPT | Mod: 26,LT,, | Performed by: RADIOLOGY

## 2022-05-20 PROCEDURE — 73090 X-RAY EXAM OF FOREARM: CPT | Mod: TC,LT

## 2022-12-08 ENCOUNTER — HOSPITAL ENCOUNTER (OUTPATIENT)
Dept: RADIOLOGY | Facility: HOSPITAL | Age: 57
Discharge: HOME | End: 2022-12-08
Attending: FAMILY MEDICINE
Payer: COMMERCIAL

## 2022-12-08 DIAGNOSIS — S32.10XA UNSPECIFIED FRACTURE OF SACRUM, INITIAL ENCOUNTER FOR CLOSED FRACTURE: ICD-10-CM

## 2022-12-08 PROCEDURE — 77080 DXA BONE DENSITY AXIAL: CPT | Mod: TC

## 2022-12-08 PROCEDURE — 77080 DXA BONE DENSITY AXIAL: CPT | Mod: 26,,, | Performed by: RADIOLOGY

## 2022-12-08 PROCEDURE — 77080 DEXA BONE DENSITY SPINE HIP: ICD-10-PCS | Mod: 26,,, | Performed by: RADIOLOGY

## 2023-08-29 NOTE — PRE ADMISSION SCREENING
Hepatology has set up outpatient labs and set up follow up with hepatology on discharge. Labs will be scheduled for 2 weeks (BMP, CBC, Hepatic panel, and INR)   Labs sent to dr clay

## (undated) DEVICE — SEE MEDLINE ITEM 157128

## (undated) DEVICE — CORD BIPOLAR 12 FOOT

## (undated) DEVICE — HOOK LONE STAR BLUNT 12MM

## (undated) DEVICE — SUT SILK 3-0 BLK PS-2 18IN

## (undated) DEVICE — CONTAINER SPECIMEN STRL 4OZ

## (undated) DEVICE — SHEET EENT SPLIT

## (undated) DEVICE — COVER LIGHT HANDLE 80/CA

## (undated) DEVICE — ADHESIVE DERMABOND ADVANCED

## (undated) DEVICE — SUT LIGACLIP SMALL XTRA

## (undated) DEVICE — SUT COATED VICRYL 4/0 27IN

## (undated) DEVICE — SUT MCRYL PLUS 4-0 PS2 27IN

## (undated) DEVICE — CLIP MED TICALL

## (undated) DEVICE — GOWN SURGICAL X-LARGE

## (undated) DEVICE — SUT CTD VICRYL 3-0 CR/SH

## (undated) DEVICE — SKINMARKER & RULER REGULAR X-F

## (undated) DEVICE — SUT 2-0 12-18IN SILK

## (undated) DEVICE — GAUZE SPONGE PEANUT STRL

## (undated) DEVICE — ADHESIVE MASTISOL VIAL 48/BX

## (undated) DEVICE — ELECTRODE BLADE INSULATED 1 IN

## (undated) DEVICE — SUT 3-0 12-18IN SILK

## (undated) DEVICE — SEE MEDLINE ITEM 154981

## (undated) DEVICE — SPONGE GAUZE 16PLY 4X4

## (undated) DEVICE — SEE MEDLINE ITEM 152622

## (undated) DEVICE — ELECTRODE REM PLYHSV RETURN 9

## (undated) DEVICE — STAPLER SKIN PROXIMATE WIDE

## (undated) DEVICE — NDL HYPO REG 25G X 1 1/2

## (undated) DEVICE — TRAY MINOR GEN SURG

## (undated) DEVICE — SUT 2/0 30IN SILK BLK BRAI